# Patient Record
Sex: FEMALE | Race: WHITE | NOT HISPANIC OR LATINO | Employment: FULL TIME | URBAN - METROPOLITAN AREA
[De-identification: names, ages, dates, MRNs, and addresses within clinical notes are randomized per-mention and may not be internally consistent; named-entity substitution may affect disease eponyms.]

---

## 2019-10-14 ENCOUNTER — TRANSCRIBE ORDERS (OUTPATIENT)
Dept: URGENT CARE | Facility: CLINIC | Age: 24
End: 2019-10-14

## 2019-10-14 ENCOUNTER — OFFICE VISIT (OUTPATIENT)
Dept: URGENT CARE | Facility: CLINIC | Age: 24
End: 2019-10-14

## 2019-10-14 VITALS
HEIGHT: 68 IN | WEIGHT: 189.2 LBS | SYSTOLIC BLOOD PRESSURE: 132 MMHG | DIASTOLIC BLOOD PRESSURE: 84 MMHG | BODY MASS INDEX: 28.67 KG/M2

## 2019-10-14 DIAGNOSIS — Z13.6 SCREENING FOR CARDIOVASCULAR CONDITION: Primary | ICD-10-CM

## 2019-10-14 DIAGNOSIS — Z23 NEEDS FLU SHOT: ICD-10-CM

## 2019-10-14 DIAGNOSIS — Z00.8 ENCOUNTER FOR BIOMETRIC SCREENING: Primary | ICD-10-CM

## 2019-10-14 DIAGNOSIS — Z00.8 ENCOUNTER FOR BIOMETRIC SCREENING: ICD-10-CM

## 2019-10-14 DIAGNOSIS — Z23 NEED FOR INFLUENZA VACCINATION: ICD-10-CM

## 2019-10-14 DIAGNOSIS — Z01.89 ENCOUNTER FOR TOBACCO USE SCREENING: Primary | ICD-10-CM

## 2019-10-14 PROCEDURE — 80323 ALKALOIDS NOS: CPT | Performed by: NURSE PRACTITIONER

## 2019-10-14 NOTE — PROGRESS NOTES
Biometric screening only       /84   Ht 5' 8" (1 727 m)   Wt 85 8 kg (189 lb 3 2 oz)   BMI 28 77 kg/m²   Waist: 33 25in

## 2019-10-17 LAB
COTININE SERPL-MCNC: NORMAL NG/ML
NICOTINE SERPL-MCNC: NORMAL NG/ML

## 2022-09-06 DIAGNOSIS — E03.9 HYPOTHYROIDISM, UNSPECIFIED TYPE: Primary | ICD-10-CM

## 2022-09-06 RX ORDER — LEVOTHYROXINE SODIUM 125 MCG
TABLET ORAL
Qty: 90 TABLET | Refills: 0 | Status: SHIPPED | OUTPATIENT
Start: 2022-09-06 | End: 2022-09-21 | Stop reason: SDUPTHER

## 2022-09-16 DIAGNOSIS — E03.9 HYPOTHYROIDISM, UNSPECIFIED TYPE: ICD-10-CM

## 2022-09-16 RX ORDER — LEVOTHYROXINE SODIUM 125 MCG
125 TABLET ORAL EVERY MORNING
Qty: 90 TABLET | Refills: 0 | Status: CANCELLED | OUTPATIENT
Start: 2022-09-16 | End: 2022-12-15

## 2022-09-21 DIAGNOSIS — E03.9 HYPOTHYROIDISM, UNSPECIFIED TYPE: ICD-10-CM

## 2022-09-21 RX ORDER — LEVOTHYROXINE SODIUM 125 MCG
125 TABLET ORAL EVERY MORNING
Qty: 90 TABLET | Refills: 0 | Status: SHIPPED | OUTPATIENT
Start: 2022-09-21 | End: 2022-09-22 | Stop reason: DRUGHIGH

## 2022-09-23 DIAGNOSIS — E03.9 HYPOTHYROIDISM, UNSPECIFIED TYPE: ICD-10-CM

## 2022-09-23 RX ORDER — LEVOTHYROXINE SODIUM 125 MCG
TABLET ORAL
Qty: 90 TABLET | Refills: 0 | Status: CANCELLED | OUTPATIENT
Start: 2022-09-23 | End: 2022-12-22

## 2022-09-23 RX ORDER — LEVOTHYROXINE SODIUM 125 MCG
125 TABLET ORAL EVERY MORNING
Qty: 90 TABLET | Refills: 0 | Status: CANCELLED | OUTPATIENT
Start: 2022-09-23 | End: 2022-12-22

## 2022-09-23 RX ORDER — LEVOTHYROXINE SODIUM 150 MCG
150 TABLET ORAL
Qty: 90 TABLET | Refills: 0 | Status: SHIPPED | OUTPATIENT
Start: 2022-09-23 | End: 2022-11-18 | Stop reason: SDUPTHER

## 2022-10-03 ENCOUNTER — APPOINTMENT (OUTPATIENT)
Dept: URGENT CARE | Facility: CLINIC | Age: 27
End: 2022-10-03

## 2022-10-03 DIAGNOSIS — Z23 NEEDS FLU SHOT: Primary | ICD-10-CM

## 2022-10-31 ENCOUNTER — TELEPHONE (OUTPATIENT)
Dept: FAMILY MEDICINE CLINIC | Facility: CLINIC | Age: 27
End: 2022-10-31

## 2022-11-02 ENCOUNTER — TELEPHONE (OUTPATIENT)
Dept: FAMILY MEDICINE CLINIC | Facility: CLINIC | Age: 27
End: 2022-11-02

## 2022-11-02 DIAGNOSIS — E03.9 HYPOTHYROIDISM, UNSPECIFIED TYPE: Primary | ICD-10-CM

## 2022-11-18 ENCOUNTER — OFFICE VISIT (OUTPATIENT)
Dept: FAMILY MEDICINE CLINIC | Facility: CLINIC | Age: 27
End: 2022-11-18

## 2022-11-18 VITALS
BODY MASS INDEX: 28.64 KG/M2 | WEIGHT: 189 LBS | SYSTOLIC BLOOD PRESSURE: 120 MMHG | HEART RATE: 92 BPM | DIASTOLIC BLOOD PRESSURE: 80 MMHG | HEIGHT: 68 IN | OXYGEN SATURATION: 98 %

## 2022-11-18 DIAGNOSIS — Z13.0 SCREENING FOR DEFICIENCY ANEMIA: ICD-10-CM

## 2022-11-18 DIAGNOSIS — E03.9 HYPOTHYROIDISM, UNSPECIFIED TYPE: ICD-10-CM

## 2022-11-18 DIAGNOSIS — R73.03 PRE-DIABETES: ICD-10-CM

## 2022-11-18 DIAGNOSIS — E03.9 ACQUIRED HYPOTHYROIDISM: Primary | ICD-10-CM

## 2022-11-18 DIAGNOSIS — E78.5 DYSLIPIDEMIA: ICD-10-CM

## 2022-11-18 DIAGNOSIS — F41.9 ANXIETY: ICD-10-CM

## 2022-11-18 RX ORDER — MELATONIN
1000 2 TIMES DAILY
COMMUNITY

## 2022-11-18 RX ORDER — LEVOTHYROXINE SODIUM 150 MCG
150 TABLET ORAL
Qty: 90 TABLET | Refills: 0 | Status: SHIPPED | OUTPATIENT
Start: 2022-11-18 | End: 2023-03-17 | Stop reason: SDUPTHER

## 2022-11-18 RX ORDER — OXCARBAZEPINE 150 MG/1
150 TABLET, FILM COATED ORAL DAILY
COMMUNITY

## 2022-11-18 RX ORDER — MIRTAZAPINE 30 MG/1
30 TABLET, FILM COATED ORAL 2 TIMES DAILY
COMMUNITY

## 2022-11-18 RX ORDER — ALPRAZOLAM 0.25 MG/1
0.25 TABLET ORAL 2 TIMES DAILY PRN
COMMUNITY

## 2022-11-18 NOTE — PROGRESS NOTES
Office Visit Note  22     Cassidy Red 32 y o  female MRN: [id number]  : 1995    Assessment:     1  Acquired hypothyroidism  Assessment & Plan:  Patient with hypothyroidism currently on 150 mcg of brand name Synthroid with a TSH level of 0 01  Since patient is feeling better at this dosage he will continue the same for now and repeat the labs in about 6-8 weeks time and then decide whether to reduce the dose or not  2  Anxiety  Assessment & Plan:  Patient is currently taking mirtazapine 30 mg twice a day Trileptal 150 mg daily and Xanax b i d  0 25 mg p r n  and is being followed by the psychiatrist will continue the same  Discussion Summary and Plan: Today's care plan and medications were reviewed with patient in detail and all their questions answered to their satisfaction  Chief Complaint   Patient presents with   • Follow-up      Subjective:  Patient has come in for a follow-up evaluation with regards to hypothyroidism  Currently she is taking 150 mcg of brand name Synthroid  However her TSH level came back less than 0 01  When she was taking 125 mcg TSH level was high at 7 91  Patient however is feeling better at this dosage of 150 mcg now  She also has a history of anxiety being followed by the psychiatrist   Medications reviewed  The following portions of the patient's history were reviewed and updated as appropriate: allergies, current medications, past family history, past medical history, past social history, past surgical history and problem list     Review of Systems   Constitutional: Negative for chills and fever  HENT: Negative for ear pain and sore throat  Eyes: Negative for pain and visual disturbance  Respiratory: Negative for cough and shortness of breath  Cardiovascular: Negative for chest pain and palpitations  Gastrointestinal: Negative for abdominal pain and vomiting  Genitourinary: Negative for dysuria and hematuria     Musculoskeletal: Negative for arthralgias and back pain  Skin: Negative for color change and rash  Neurological: Negative for seizures and syncope  All other systems reviewed and are negative  Historical Information   Patient Active Problem List   Diagnosis   • Acquired hypothyroidism   • Anxiety     History reviewed  No pertinent past medical history  History reviewed  No pertinent surgical history  Social History     Substance and Sexual Activity   Alcohol Use None     Social History     Substance and Sexual Activity   Drug Use Not on file     Social History     Tobacco Use   Smoking Status Not on file   Smokeless Tobacco Not on file     History reviewed  No pertinent family history  Health Maintenance Due   Topic   • Hepatitis C Screening    • Depression Screening    • HIV Screening    • BMI: Followup Plan    • BMI: Adult    • Annual Physical    • Cervical Cancer Screening       Meds/Allergies       Current Outpatient Medications:   •  ALPRAZolam (XANAX) 0 25 mg tablet, Take 0 25 mg by mouth 2 (two) times a day as needed for anxiety, Disp: , Rfl:   •  cholecalciferol (VITAMIN D3) 1,000 units tablet, Take 1,000 Units by mouth 2 (two) times a day, Disp: , Rfl:   •  mirtazapine (REMERON) 30 mg tablet, Take 30 mg by mouth 2 (two) times a day, Disp: , Rfl:   •  OXcarbazepine (Trileptal) 150 mg tablet, Take 150 mg by mouth daily, Disp: , Rfl:   •  Synthroid 150 MCG tablet, Take 1 tablet (150 mcg total) by mouth daily in the early morning, Disp: 90 tablet, Rfl: 0      Objective:    Vitals:   /80 (BP Location: Right arm, Patient Position: Sitting, Cuff Size: Standard)   Pulse 92   Ht 5' 8" (1 727 m)   Wt 85 7 kg (189 lb)   SpO2 98%   BMI 28 74 kg/m²   Body mass index is 28 74 kg/m²  Vitals:    11/18/22 1008   Weight: 85 7 kg (189 lb)       Physical Exam  Vitals and nursing note reviewed  Constitutional:       Appearance: Normal appearance     Cardiovascular:      Rate and Rhythm: Normal rate and regular rhythm  Heart sounds: Normal heart sounds  Pulmonary:      Effort: Pulmonary effort is normal       Breath sounds: Normal breath sounds  Musculoskeletal:      Cervical back: Normal range of motion and neck supple  Right lower leg: No edema  Left lower leg: No edema  Neurological:      Mental Status: She is alert and oriented to person, place, and time  Lab Review   No visits with results within 2 Month(s) from this visit  Latest known visit with results is:   Orders Only on 10/14/2019   Component Date Value Ref Range Status   • Nicotine 10/14/2019 None Detected  ng/mL Final    This test was developed and its performance characteristics  determined by [facility]  It has not been cleared or approved  by the Food and Drug Administration  Nicotine levels greater than 2 0 are consistent with the use of  tobacco or tobacco cessation products  • Cotinine 10/14/2019 None Detected  ng/mL Final    This test was developed and its performance characteristics  determined by [facility]  It has not been cleared or approved  by the Food and Drug Administration  Cotinine levels greater than 20 0 are consistent with the use of  tobacco or tobacco cessation products  Yosvany Hummel MD        "This note has been constructed using a voice recognition system  Therefore there may be syntax, spelling, and/or grammatical errors   Please call if you have any questions  "

## 2023-03-17 ENCOUNTER — OFFICE VISIT (OUTPATIENT)
Dept: FAMILY MEDICINE CLINIC | Facility: CLINIC | Age: 28
End: 2023-03-17

## 2023-03-17 VITALS
SYSTOLIC BLOOD PRESSURE: 126 MMHG | BODY MASS INDEX: 28.74 KG/M2 | HEIGHT: 68 IN | DIASTOLIC BLOOD PRESSURE: 76 MMHG | HEART RATE: 66 BPM | OXYGEN SATURATION: 97 %

## 2023-03-17 DIAGNOSIS — E03.9 ACQUIRED HYPOTHYROIDISM: Primary | ICD-10-CM

## 2023-03-17 DIAGNOSIS — E03.9 HYPOTHYROIDISM, UNSPECIFIED TYPE: ICD-10-CM

## 2023-03-17 DIAGNOSIS — E04.1 THYROID NODULE: ICD-10-CM

## 2023-03-17 DIAGNOSIS — E78.00 HYPERCHOLESTEROLEMIA: ICD-10-CM

## 2023-03-17 DIAGNOSIS — F41.9 ANXIETY: ICD-10-CM

## 2023-03-17 RX ORDER — LEVOTHYROXINE SODIUM 150 MCG
150 TABLET ORAL
Qty: 90 TABLET | Refills: 1 | Status: SHIPPED | OUTPATIENT
Start: 2023-03-17

## 2023-03-17 NOTE — PROGRESS NOTES
Office Visit Note  23     Di Red 25 y o  female MRN: [id number]  : 1995    Assessment:     1  Acquired hypothyroidism  Assessment & Plan: We will continue the current dosage of brand-name Synthroid follow-up with repeat lab at a later date  We will also get ultrasound of the thyroid  Orders:  -     TSH, 3rd generation with Free T4 reflex; Future; Expected date: 2023    2  Anxiety  Assessment & Plan:  Patient currently on mirtazapine 30 mg twice a day Trileptal 150 mg daily and Xanax 0 25 mg twice daily as needed  She sees a psychiatrist every 3 months  3  Thyroid nodule  -     US thyroid; Future; Expected date: 2023    4  Hypercholesterolemia  Assessment & Plan:  Since patient's cholesterol coming up slightly high we will repeat the labs in 4 months from now make sure it is not going up any higher and then decide    Orders:  -     Lipid panel; Future      BMI Counseling: Body mass index is 28 74 kg/m²  The BMI is above normal  Nutrition recommendations include decreasing portion sizes, decreasing fast food intake, consuming healthier snacks, moderation in carbohydrate intake and reducing intake of cholesterol  Exercise recommendations include moderate physical activity 150 minutes/week  No pharmacotherapy was ordered  Rationale for BMI follow-up plan is due to patient being overweight or obese  Depression Screening and Follow-up Plan: Patient was screened for depression during today's encounter  They screened negative with a PHQ-2 score of 0  Discussion Summary and Plan: Today's care plan and medications were reviewed with patient in detail and all their questions answered to their satisfaction  Chief Complaint   Patient presents with   • Follow-up      Subjective:  Patient has coming here for a follow-up evaluation regarding hypothyroidism currently taking 150 mcg of brand-name Synthroid daily last TSH level was 0 05    Patient feels better at this dosing when we try to lower it she was not feeling right  Lipid profile also showed slight elevation in the cholesterol even though she feels she is under very good strict diet  Her HDL is at 49  Family history of hypercholesterolemia  She is also being followed by the psychiatrist with regards to her anxiety currently taking Remeron and Trileptal      The following portions of the patient's history were reviewed and updated as appropriate: allergies, current medications, past family history, past medical history, past social history, past surgical history and problem list     Review of Systems   Constitutional: Negative for chills and fever  HENT: Negative for ear pain and sore throat  Eyes: Negative for pain and visual disturbance  Respiratory: Negative for cough and shortness of breath  Cardiovascular: Negative for chest pain and palpitations  Gastrointestinal: Negative for abdominal pain and vomiting  Genitourinary: Negative for dysuria and hematuria  Musculoskeletal: Negative for arthralgias and back pain  Skin: Negative for color change and rash  Neurological: Negative for seizures and syncope  All other systems reviewed and are negative  Historical Information   Patient Active Problem List   Diagnosis   • Acquired hypothyroidism   • Anxiety   • Hypercholesterolemia     History reviewed  No pertinent past medical history  History reviewed  No pertinent surgical history  Social History     Substance and Sexual Activity   Alcohol Use None    Comment: Occasional     Social History     Substance and Sexual Activity   Drug Use Never     Social History     Tobacco Use   Smoking Status Never   Smokeless Tobacco Never     History reviewed  No pertinent family history    Health Maintenance Due   Topic   • Hepatitis C Screening    • HIV Screening    • BMI: Followup Plan    • Annual Physical    • Cervical Cancer Screening       Meds/Allergies       Current Outpatient Medications:   • ALPRAZolam (XANAX) 0 25 mg tablet, Take 0 25 mg by mouth 2 (two) times a day as needed for anxiety, Disp: , Rfl:   •  cholecalciferol (VITAMIN D3) 1,000 units tablet, Take 1,000 Units by mouth 2 (two) times a day, Disp: , Rfl:   •  mirtazapine (REMERON) 30 mg tablet, Take 30 mg by mouth 2 (two) times a day, Disp: , Rfl:   •  OXcarbazepine (TRILEPTAL) 150 mg tablet, Take 150 mg by mouth daily, Disp: , Rfl:   •  Synthroid 150 MCG tablet, Take 1 tablet (150 mcg total) by mouth daily in the early morning, Disp: 90 tablet, Rfl: 0      Objective:    Vitals:   /76 (BP Location: Right arm, Patient Position: Sitting, Cuff Size: Standard)   Pulse 66   Ht 5' 8" (1 727 m)   SpO2 97%   BMI 28 74 kg/m²   Body mass index is 28 74 kg/m²  Vitals:       Physical Exam  Vitals and nursing note reviewed  Constitutional:       Appearance: Normal appearance  Neck:      Comments: Question thyroid nodule  Cardiovascular:      Rate and Rhythm: Normal rate and regular rhythm  Heart sounds: Normal heart sounds  Pulmonary:      Effort: Pulmonary effort is normal       Breath sounds: Normal breath sounds  Musculoskeletal:      Cervical back: Normal range of motion and neck supple  Right lower leg: No edema  Left lower leg: No edema  Neurological:      Mental Status: She is alert  Lab Review   No visits with results within 2 Month(s) from this visit  Latest known visit with results is:   Orders Only on 10/14/2019   Component Date Value Ref Range Status   • Nicotine 10/14/2019 None Detected  ng/mL Final    This test was developed and its performance characteristics  determined by [facility]  It has not been cleared or approved  by the Food and Drug Administration  Nicotine levels greater than 2 0 are consistent with the use of  tobacco or tobacco cessation products     • Cotinine 10/14/2019 None Detected  ng/mL Final    This test was developed and its performance characteristics  determined by [facility]  It has not been cleared or approved  by the Food and Drug Administration  Cotinine levels greater than 20 0 are consistent with the use of  tobacco or tobacco cessation products  Elba Erwin MD        "This note has been constructed using a voice recognition system  Therefore there may be syntax, spelling, and/or grammatical errors   Please call if you have any questions  "

## 2023-03-17 NOTE — ASSESSMENT & PLAN NOTE
Patient currently on mirtazapine 30 mg twice a day Trileptal 150 mg daily and Xanax 0 25 mg twice daily as needed  She sees a psychiatrist every 3 months

## 2023-03-17 NOTE — ASSESSMENT & PLAN NOTE
Since patient's cholesterol coming up slightly high we will repeat the labs in 4 months from now make sure it is not going up any higher and then decide

## 2023-03-17 NOTE — ASSESSMENT & PLAN NOTE
We will continue the current dosage of brand-name Synthroid follow-up with repeat lab at a later date  We will also get ultrasound of the thyroid

## 2023-06-13 ENCOUNTER — HOSPITAL ENCOUNTER (OUTPATIENT)
Dept: ULTRASOUND IMAGING | Facility: HOSPITAL | Age: 28
Discharge: HOME | End: 2023-06-13
Payer: COMMERCIAL

## 2023-06-13 DIAGNOSIS — E04.1 THYROID NODULE: ICD-10-CM

## 2023-06-13 PROCEDURE — 76536 US EXAM OF HEAD AND NECK: CPT

## 2023-06-20 ENCOUNTER — APPOINTMENT (OUTPATIENT)
Dept: URGENT CARE | Facility: CLINIC | Age: 28
End: 2023-06-20

## 2023-06-20 DIAGNOSIS — E03.9 ACQUIRED HYPOTHYROIDISM: Primary | ICD-10-CM

## 2023-06-20 DIAGNOSIS — E78.00 HYPERCHOLESTEROLEMIA: ICD-10-CM

## 2023-06-20 LAB
CHOLEST SERPL-MCNC: 203 MG/DL
HDLC SERPL-MCNC: 57 MG/DL
LDLC SERPL CALC-MCNC: 130 MG/DL (ref 0–100)
NONHDLC SERPL-MCNC: 146 MG/DL
T4 FREE SERPL-MCNC: 1.15 NG/DL (ref 0.61–1.12)
TRIGL SERPL-MCNC: 78 MG/DL
TSH SERPL DL<=0.05 MIU/L-ACNC: 0.45 UIU/ML (ref 0.45–4.5)

## 2023-06-20 PROCEDURE — 84443 ASSAY THYROID STIM HORMONE: CPT | Performed by: INTERNAL MEDICINE

## 2023-06-20 PROCEDURE — 84439 ASSAY OF FREE THYROXINE: CPT | Performed by: NURSE PRACTITIONER

## 2023-06-20 PROCEDURE — 80061 LIPID PANEL: CPT | Performed by: INTERNAL MEDICINE

## 2023-07-20 ENCOUNTER — OFFICE VISIT (OUTPATIENT)
Dept: FAMILY MEDICINE CLINIC | Facility: CLINIC | Age: 28
End: 2023-07-20
Payer: COMMERCIAL

## 2023-07-20 VITALS
HEIGHT: 68 IN | OXYGEN SATURATION: 98 % | HEART RATE: 74 BPM | DIASTOLIC BLOOD PRESSURE: 74 MMHG | SYSTOLIC BLOOD PRESSURE: 112 MMHG | BODY MASS INDEX: 28.74 KG/M2

## 2023-07-20 DIAGNOSIS — F41.9 ANXIETY: ICD-10-CM

## 2023-07-20 DIAGNOSIS — E03.9 HYPOTHYROIDISM, UNSPECIFIED TYPE: ICD-10-CM

## 2023-07-20 DIAGNOSIS — E78.00 HYPERCHOLESTEROLEMIA: Primary | ICD-10-CM

## 2023-07-20 DIAGNOSIS — E03.9 ACQUIRED HYPOTHYROIDISM: ICD-10-CM

## 2023-07-20 DIAGNOSIS — R21 RASH: ICD-10-CM

## 2023-07-20 PROCEDURE — 99214 OFFICE O/P EST MOD 30 MIN: CPT | Performed by: INTERNAL MEDICINE

## 2023-07-20 RX ORDER — OXCARBAZEPINE 300 MG/1
300 TABLET, FILM COATED ORAL 2 TIMES DAILY
COMMUNITY
Start: 2023-05-22

## 2023-07-20 RX ORDER — TRIAMCINOLONE ACETONIDE 5 MG/G
CREAM TOPICAL 3 TIMES DAILY
Qty: 30 G | Refills: 0 | Status: SHIPPED | OUTPATIENT
Start: 2023-07-20

## 2023-07-20 RX ORDER — LEVOTHYROXINE SODIUM 150 MCG
150 TABLET ORAL
Qty: 90 TABLET | Refills: 1 | Status: SHIPPED | OUTPATIENT
Start: 2023-07-20

## 2023-07-20 RX ORDER — MIRTAZAPINE 30 MG/1
30 TABLET, FILM COATED ORAL
Status: SHIPPED
Start: 2023-07-20

## 2023-07-20 NOTE — ASSESSMENT & PLAN NOTE
Since the TSH level is almost in the normal range I will continue the same dose of the brand-name Synthroid at 150 mcg daily ultrasound of the thyroid showed small nodule on the right side does not meet the criteria for getting a biopsy we will continue to monitor.

## 2023-07-20 NOTE — PROGRESS NOTES
Name: Peter Squires      : 1995      MRN: [id number]  Encounter Provider: Lucero Arias MD  Encounter Date: 2023   Encounter department: [address] [place] [address]     1. Hypercholesterolemia  Assessment & Plan:  Patient cholesterol did come down we will try to monitor closely however her LDL is still high at 138 again recommend to be on strict diet exercise lifestyle modification      2. Anxiety  Assessment & Plan:  Patient is being followed by the psychiatrist currently on mirtazapine 30 mg at bedtime Trileptal 150 mg twice a day and she is also on Xanax 0.25 mg twice a day as needed. 3. Acquired hypothyroidism  Assessment & Plan:  Since the TSH level is almost in the normal range I will continue the same dose of the brand-name Synthroid at 150 mcg daily ultrasound of the thyroid showed small nodule on the right side does not meet the criteria for getting a biopsy we will continue to monitor. 4. Rash  Assessment & Plan:  Small area of rash noted on the left calf area and also right shoulder area appears like dermatitis we will prescribe triamcinolone cream for local application. BMI Counseling: Body mass index is 28.74 kg/m². The BMI is above normal. Nutrition recommendations include decreasing portion sizes, encouraging healthy choices of fruits and vegetables, consuming healthier snacks, moderation in carbohydrate intake and reducing intake of cholesterol. Exercise recommendations include strength training exercises. No pharmacotherapy was ordered. Rationale for BMI follow-up plan is due to patient being overweight or obese. Subjective     Patient is coming here for evaluation regarding her hypothyroidism, hypercholesterolemia. Patient has noticed rash on the left leg in the calf area for the past couple of months no itching about the size of 1-1/2 cm in diameter. She also noticed  near the anterior aspect of the right shoulder really.   Patient had lab work done which has shown a TSH level of 0.448 and the T4 level was 1.15 currently patient is taking 150 mcg of brand-name Synthroid daily. Her cholesterol level has come down compared to before it was 215 now it is 203. Review of Systems   Constitutional: Negative for appetite change, chills, fatigue and fever. HENT: Negative for ear pain, sore throat and trouble swallowing. Eyes: Negative for pain and visual disturbance. Respiratory: Negative for cough and shortness of breath. Cardiovascular: Negative for chest pain and palpitations. Gastrointestinal: Negative for abdominal pain, constipation, diarrhea and vomiting. Endocrine: Negative for polydipsia. Genitourinary: Negative for difficulty urinating, dysuria, flank pain and hematuria. Musculoskeletal: Negative for arthralgias, back pain and myalgias. Skin: Negative for rash and wound. Neurological: Negative for dizziness, tremors, seizures, syncope and headaches. Psychiatric/Behavioral: Negative for confusion and sleep disturbance. The patient is not nervous/anxious. All other systems reviewed and are negative. No past medical history on file. No past surgical history on file. No family history on file.   Social History     Socioeconomic History   • Marital status: Single     Spouse name: Not on file   • Number of children: Not on file   • Years of education: Not on file   • Highest education level: Not on file   Occupational History   • Not on file   Tobacco Use   • Smoking status: Never   • Smokeless tobacco: Never   Substance and Sexual Activity   • Alcohol use: Not on file     Comment: Occasional   • Drug use: Never   • Sexual activity: Not on file   Other Topics Concern   • Not on file   Social History Narrative   • Not on file     Social Determinants of Health     Financial Resource Strain: Not on file   Food Insecurity: Not on file   Transportation Needs: Not on file   Physical Activity: Not on file   Stress: Not on file   Social Connections: Not on file   Intimate Partner Violence: Not on file   Housing Stability: Not on file     Current Outpatient Medications on File Prior to Visit   Medication Sig   • ALPRAZolam (XANAX) 0.25 mg tablet Take 0.25 mg by mouth 2 (two) times a day as needed for anxiety   • cholecalciferol (VITAMIN D3) 1,000 units tablet Take 1,000 Units by mouth 2 (two) times a day   • OXcarbazepine (TRILEPTAL) 300 mg tablet Take 300 mg by mouth 2 (two) times a day   • Synthroid 150 MCG tablet Take 1 tablet (150 mcg total) by mouth daily in the early morning   • [DISCONTINUED] mirtazapine (REMERON) 30 mg tablet Take 30 mg by mouth 2 (two) times a day   • [DISCONTINUED] OXcarbazepine (TRILEPTAL) 150 mg tablet Take 150 mg by mouth daily     No Known Allergies  Immunization History   Administered Date(s) Administered   • COVID-19 MODERNA VACC 0.5 ML IM 01/20/2021, 02/17/2021, 11/29/2021   • COVID-19 Moderna Vac BIVALENT 12 Yr+ IM (BOOSTER ONLY) 0.5 ML 10/17/2022   • DTP 1995, 1995, 01/11/1994   • DTaP 5 10/24/1994, 09/10/1998   • HPV Quadrivalent 08/10/2007, 10/09/2007, 03/03/2008   • Hep B, adult 1995, 1995, 01/11/1994   • Hib (HbOC) 1995, 1995, 01/11/1994, 07/05/1994   • INFLUENZA 11/05/2014, 11/19/2018, 10/14/2019, 11/01/2021, 10/03/2022   • IPV 1995, 1995, 10/24/1994   • Influenza, injectable, quadrivalent, preservative free 0.5 mL 10/14/2019, 10/03/2022   • Influenza, seasonal, injectable 11/17/2008   • MMR 07/05/1994, 09/10/1998   • Meningococcal, Unknown Serogroups 08/10/2007   • OPV 09/10/1996   • Td (adult), adsorbed 02/09/2005   • Tdap 09/07/2013, 11/19/2018   • Varicella 07/12/1995       Objective     /74   Pulse 74   Ht 5' 8" (1.727 m)   SpO2 98%   BMI 28.74 kg/m²     Physical Exam  Vitals and nursing note reviewed. Constitutional:       Appearance: Normal appearance. She is not ill-appearing. HENT:      Head: Normocephalic.       Right Ear: External ear normal. There is no impacted cerumen. Left Ear: External ear normal. There is no impacted cerumen. Nose: Nose normal. No congestion or rhinorrhea. Mouth/Throat:      Pharynx: Oropharynx is clear. No posterior oropharyngeal erythema. Eyes:      General:         Right eye: No discharge. Left eye: No discharge. Extraocular Movements: Extraocular movements intact. Conjunctiva/sclera: Conjunctivae normal.   Neck:      Comments: Question thyroid nodule  Cardiovascular:      Rate and Rhythm: Normal rate and regular rhythm. Pulses: Normal pulses. Heart sounds: Normal heart sounds. No murmur heard. Pulmonary:      Effort: Pulmonary effort is normal. No respiratory distress. Breath sounds: Normal breath sounds. Abdominal:      General: Bowel sounds are normal. There is no distension. Tenderness: There is no abdominal tenderness. Musculoskeletal:      Cervical back: Normal range of motion and neck supple. Right lower leg: No edema. Left lower leg: No edema. Skin:     Coloration: Skin is not jaundiced or pale. Findings: Rash (for 3 months on back of left calf and right arm pit) present. Neurological:      General: No focal deficit present. Mental Status: She is alert and oriented to person, place, and time. Sensory: No sensory deficit. Motor: No weakness. Coordination: Coordination normal.      Gait: Gait normal.   Psychiatric:         Mood and Affect: Mood normal.         Behavior: Behavior normal.         Thought Content:  Thought content normal.         Judgment: Judgment normal.       Elizabeth Story MD

## 2023-07-20 NOTE — ASSESSMENT & PLAN NOTE
Patient is being followed by the psychiatrist currently on mirtazapine 30 mg at bedtime Trileptal 150 mg twice a day and she is also on Xanax 0.25 mg twice a day as needed.

## 2023-07-20 NOTE — ASSESSMENT & PLAN NOTE
Patient cholesterol did come down we will try to monitor closely however her LDL is still high at 138 again recommend to be on strict diet exercise lifestyle modification

## 2023-07-20 NOTE — ASSESSMENT & PLAN NOTE
Small area of rash noted on the left calf area and also right shoulder area appears like dermatitis we will prescribe triamcinolone cream for local application.

## 2023-12-23 ENCOUNTER — OFFICE VISIT (OUTPATIENT)
Dept: URGENT CARE | Age: 28
End: 2023-12-23
Payer: COMMERCIAL

## 2023-12-23 VITALS
SYSTOLIC BLOOD PRESSURE: 122 MMHG | RESPIRATION RATE: 18 BRPM | HEART RATE: 89 BPM | OXYGEN SATURATION: 99 % | TEMPERATURE: 97.1 F | DIASTOLIC BLOOD PRESSURE: 80 MMHG

## 2023-12-23 DIAGNOSIS — J02.9 SORE THROAT: Primary | ICD-10-CM

## 2023-12-23 LAB — S PYO AG THROAT QL: NEGATIVE

## 2023-12-23 PROCEDURE — 87147 CULTURE TYPE IMMUNOLOGIC: CPT

## 2023-12-23 PROCEDURE — 87070 CULTURE OTHR SPECIMN AEROBIC: CPT

## 2023-12-23 PROCEDURE — 87880 STREP A ASSAY W/OPTIC: CPT

## 2023-12-23 PROCEDURE — 87636 SARSCOV2 & INF A&B AMP PRB: CPT

## 2023-12-23 NOTE — PATIENT INSTRUCTIONS
Hydration and rest.  Acetaminophen and ibuprofen for pain relief and fever reduction.   Recommend throat lozenges, anesthetic spray such as chloraseptic, and gargling warm salt water for throat irritation.   COVID/influenza testing.  Throat culture sent.   Use the [facility] MyChart to obtain lab results.  PCP follow up in 3-5 days.   Go to an emergency department if difficulty breathing occurs or if symptoms worsen.    Recommended supplements for potential COVID-19 is the following: Vitamin D3 2000 IU  daily ,  Vitamin C 1g  every 12 hours , Multivitamin Daily

## 2023-12-23 NOTE — PROGRESS NOTES
[facility] Now        NAME: Anali Red is a 28 y.o. female  : 1995    MRN: [id number]  DATE: 2023  TIME: 2:03 PM      Assessment and Plan     Sore throat [J02.9]  1. Sore throat  POCT rapid strepA    Throat culture    Covid/Flu- Office Collect Normal        Rapid strep negative. Throat culture sent. Will hold on antibiotics at this time, no exudate, no fever, exposure to covid     Patient Instructions     Hydration and rest.  Acetaminophen and ibuprofen for pain relief and fever reduction.   Recommend throat lozenges, anesthetic spray such as chloraseptic, and gargling warm salt water for throat irritation.   COVID/influenza testing.  Throat culture sent.   Use the [facility] MyChart to obtain lab results.  PCP follow up in 3-5 days.   Go to an emergency department if difficulty breathing occurs or if symptoms worsen.    Recommended supplements for potential COVID-19 is the following: Vitamin D3 2000 IU  daily ,  Vitamin C 1g  every 12 hours , Multivitamin Daily     Chief Complaint     Chief Complaint   Patient presents with    Sore Throat     Sore throat that has been worsening for past few days. Home covid test negative.          History of Present Illness     Patient is a 28-year-old female who presents with sore throat over the past few days.  States she had family members tested positive for COVID.  At home test was negative.  Denies cough.  Denies vomiting or diarrhea.  Denies fever.    Sore Throat   This is a new problem. The current episode started in the past 7 days. The problem has been gradually worsening. Neither side of throat is experiencing more pain than the other. There has been no fever. The pain is at a severity of 6/10. Associated symptoms include ear discharge, headaches and swollen glands. Pertinent negatives include no abdominal pain, congestion, coughing, diarrhea, drooling, ear pain, hoarse voice, plugged ear sensation, neck pain, shortness of breath, stridor,  trouble swallowing or vomiting. She has had no exposure to strep or mono.       Review of Systems     Review of Systems   HENT:  Positive for ear discharge and sore throat. Negative for congestion, drooling, ear pain, hoarse voice and trouble swallowing.    Respiratory:  Negative for cough, shortness of breath and stridor.    Gastrointestinal:  Negative for abdominal pain, diarrhea and vomiting.   Musculoskeletal:  Negative for neck pain.   Neurological:  Positive for headaches.   All other systems reviewed and are negative.        Current Medications       Current Outpatient Medications:     ALPRAZolam (XANAX) 0.25 mg tablet, Take 0.25 mg by mouth 2 (two) times a day as needed for anxiety, Disp: , Rfl:     cholecalciferol (VITAMIN D3) 1,000 units tablet, Take 1,000 Units by mouth 2 (two) times a day, Disp: , Rfl:     mirtazapine (REMERON) 30 mg tablet, Take 1 tablet (30 mg total) by mouth daily at bedtime, Disp: , Rfl:     OXcarbazepine (TRILEPTAL) 300 mg tablet, Take 300 mg by mouth 2 (two) times a day, Disp: , Rfl:     Synthroid 150 MCG tablet, Take 1 tablet (150 mcg total) by mouth daily in the early morning, Disp: 90 tablet, Rfl: 1    triamcinolone (KENALOG) 0.5 % cream, Apply topically 3 (three) times a day, Disp: 30 g, Rfl: 0    Current Allergies     Allergies as of 12/23/2023    (No Known Allergies)              The following portions of the patient's history were reviewed and updated as appropriate: allergies, current medications, past family history, past medical history, past social history, past surgical history and problem list.     History reviewed. No pertinent past medical history.    History reviewed. No pertinent surgical history.    History reviewed. No pertinent family history.      Medications have been verified.        Objective     /80   Pulse 89   Temp (!) 97.1 °F (36.2 °C)   Resp 18   SpO2 99%   No LMP recorded.         Physical Exam     Physical Exam  Vitals and nursing note  reviewed.   Constitutional:       General: She is awake. She is not in acute distress.     Appearance: Normal appearance. She is not ill-appearing, toxic-appearing or diaphoretic.   HENT:      Right Ear: Tympanic membrane, ear canal and external ear normal.      Left Ear: Tympanic membrane, ear canal and external ear normal.      Nose: Nose normal.      Mouth/Throat:      Lips: Pink.      Mouth: Mucous membranes are moist.      Pharynx: Oropharynx is clear. Uvula midline. Posterior oropharyngeal erythema (viral ulcers posterior pharynx) present. No pharyngeal swelling, oropharyngeal exudate or uvula swelling.      Tonsils: No tonsillar exudate or tonsillar abscesses. 1+ on the right. 2+ on the left.   Cardiovascular:      Rate and Rhythm: Normal rate.      Pulses: Normal pulses.      Heart sounds: Normal heart sounds, S1 normal and S2 normal.   Pulmonary:      Effort: Pulmonary effort is normal.      Breath sounds: Normal breath sounds and air entry.   Skin:     General: Skin is warm.      Capillary Refill: Capillary refill takes less than 2 seconds.   Neurological:      Mental Status: She is alert.   Psychiatric:         Mood and Affect: Mood normal.         Behavior: Behavior normal.         Thought Content: Thought content normal.         Judgment: Judgment normal.

## 2023-12-24 LAB
FLUAV RNA RESP QL NAA+PROBE: NEGATIVE
FLUBV RNA RESP QL NAA+PROBE: NEGATIVE
SARS-COV-2 RNA RESP QL NAA+PROBE: NEGATIVE

## 2023-12-26 LAB — BACTERIA THROAT CULT: ABNORMAL

## 2023-12-27 ENCOUNTER — TELEPHONE (OUTPATIENT)
Dept: URGENT CARE | Facility: MEDICAL CENTER | Age: 28
End: 2023-12-27

## 2023-12-27 DIAGNOSIS — J02.0 STREP THROAT: Primary | ICD-10-CM

## 2023-12-27 RX ORDER — PENICILLIN V POTASSIUM 500 MG/1
500 TABLET ORAL EVERY 12 HOURS
Qty: 20 TABLET | Refills: 0 | Status: SHIPPED | OUTPATIENT
Start: 2023-12-27 | End: 2024-01-06

## 2023-12-27 NOTE — TELEPHONE ENCOUNTER
Patient returned phone call. Aware of positive throat culture. Still having sore throat. Aware antibiotic will be sent to pharmacy on file.

## 2024-02-07 NOTE — PROGRESS NOTES
Office Visit Note  24     Anali Red 28 y.o. female MRN: [id number]  : 1995    Assessment:     1. Acquired hypothyroidism  Assessment & Plan:  Patient with hypothyroidism TSH level normal we will continue the same dose of Synthroid brand-name 150 mcg daily      2. Anxiety  Assessment & Plan:  Patient is being followed by the psychiatry is currently taking Trileptal 300 mg twice a day and mirtazapine 30 mg daily at bedtime we will continue follow-up with a psychiatrist.      3. Hypercholesterolemia  Assessment & Plan:  Mild elevation in the cholesterol was noted with elevated LDL we will get a repeat labs done    Orders:  -     Lipid panel; Future    4. Obesity (BMI 30.0-34.9)  Assessment & Plan:  Patient BMI is 32.08.  Patient has been trying to lose weight with good diet well-balanced diet going to the gym but she continues to have increased BMI's.  Discussed with patient regarding Wegovy once a week injection will order the same and see the response and follow-up.    Orders:  -     Semaglutide-Weight Management (WEGOVY) 0.25 MG/0.5ML; Inject 0.5 mL (0.25 mg total) under the skin once a week    5. Screening for deficiency anemia  -     CBC and differential; Future    6. Pre-diabetes  -     Comprehensive metabolic panel; Future  -     Hemoglobin A1C; Future; Expected date: 2024  -     UA w Reflex to Microscopic w Reflex to Culture; Future; Expected date: 2024               Discussion Summary and Plan:  Today's care plan and medications were reviewed with patient in detail and all their questions answered to their satisfaction.    Chief Complaint   Patient presents with    Follow-up      Subjective:  Patient is coming here for a follow-up evaluation with regards to symptoms of hypothyroidism and anxiety disorder mild elevation in cholesterol levels.  Recent TSH level came back normal currently she is taking Synthroid brand name 150 mcg daily.  Patient is also concerned about the weight her  BMI is 32.08 with a 211 pounds.  Medication reviewed labs reviewed.        The following portions of the patient's history were reviewed and updated as appropriate: allergies, current medications, past family history, past medical history, past social history, past surgical history and problem list.    Review of Systems   Constitutional:  Negative for chills and fever.   HENT:  Negative for ear pain and sore throat.    Eyes:  Negative for pain and visual disturbance.   Respiratory:  Negative for cough and shortness of breath.    Cardiovascular:  Negative for chest pain and palpitations.   Gastrointestinal:  Negative for abdominal pain and vomiting.   Genitourinary:  Negative for dysuria and hematuria.   Musculoskeletal:  Negative for arthralgias and back pain.   Skin:  Negative for color change and rash.   Neurological:  Negative for seizures and syncope.   All other systems reviewed and are negative.        Historical Information   Patient Active Problem List   Diagnosis    Acquired hypothyroidism    Anxiety    Hypercholesterolemia    Obesity (BMI 30.0-34.9)     History reviewed. No pertinent past medical history.  History reviewed. No pertinent surgical history.  Social History     Substance and Sexual Activity   Alcohol Use None    Comment: Occasional     Social History     Substance and Sexual Activity   Drug Use Never     Social History     Tobacco Use   Smoking Status Never   Smokeless Tobacco Never     History reviewed. No pertinent family history.  Health Maintenance Due   Topic    Hepatitis C Screening     HIV Screening     Annual Physical     Cervical Cancer Screening     COVID-19 Vaccine (5 - 2023-24 season)    Depression Screening       Meds/Allergies       Current Outpatient Medications:     ALPRAZolam (XANAX) 0.25 mg tablet, Take 0.25 mg by mouth 2 (two) times a day as needed for anxiety, Disp: , Rfl:     cholecalciferol (VITAMIN D3) 1,000 units tablet, Take 1,000 Units by mouth 2 (two) times a day,  "Disp: , Rfl:     mirtazapine (REMERON) 30 mg tablet, Take 1 tablet (30 mg total) by mouth daily at bedtime, Disp: , Rfl:     OXcarbazepine (TRILEPTAL) 300 mg tablet, Take 300 mg by mouth 2 (two) times a day, Disp: , Rfl:     Semaglutide-Weight Management (WEGOVY) 0.25 MG/0.5ML, Inject 0.5 mL (0.25 mg total) under the skin once a week, Disp: 2 mL, Rfl: 0    Synthroid 150 MCG tablet, Take 1 tablet (150 mcg total) by mouth daily in the early morning, Disp: 90 tablet, Rfl: 1      Objective:    Vitals:   /76 (BP Location: Right arm, Patient Position: Sitting, Cuff Size: Standard)   Pulse 87   Ht 5' 8\" (1.727 m)   Wt 95.7 kg (211 lb)   SpO2 98%   BMI 32.08 kg/m²   Body mass index is 32.08 kg/m².  Vitals:    02/08/24 1627   Weight: 95.7 kg (211 lb)       Physical Exam  Vitals and nursing note reviewed.   Constitutional:       Appearance: Normal appearance.   Cardiovascular:      Rate and Rhythm: Normal rate and regular rhythm.      Heart sounds: Normal heart sounds.   Pulmonary:      Effort: Pulmonary effort is normal.      Breath sounds: Normal breath sounds.   Abdominal:      Palpations: Abdomen is soft.   Musculoskeletal:         General: Normal range of motion.      Cervical back: Normal range of motion and neck supple.      Right lower leg: No edema.      Left lower leg: No edema.   Skin:     General: Skin is warm and dry.   Neurological:      General: No focal deficit present.      Mental Status: She is alert and oriented to person, place, and time.   Psychiatric:         Mood and Affect: Mood normal.         Behavior: Behavior normal.         Lab Review   Office Visit on 12/23/2023   Component Date Value Ref Range Status     RAPID STREP A 12/23/2023 Negative  Negative Final    Throat Culture 12/23/2023 2+ Growth of Beta Hemolytic Streptococcus NOT Group A (A)   Final    SARS-CoV-2 12/23/2023 Negative  Negative Final    INFLUENZA A PCR 12/23/2023 Negative  Negative Final    INFLUENZA B PCR 12/23/2023 " "Negative  Negative Final         Rasta Benoit MD        \"This note has been constructed using a voice recognition system.Therefore there may be syntax, spelling, and/or grammatical errors. Please call if you have any questions. \"  "

## 2024-02-08 ENCOUNTER — OFFICE VISIT (OUTPATIENT)
Dept: FAMILY MEDICINE CLINIC | Facility: CLINIC | Age: 29
End: 2024-02-08
Payer: COMMERCIAL

## 2024-02-08 VITALS
HEART RATE: 87 BPM | DIASTOLIC BLOOD PRESSURE: 76 MMHG | HEIGHT: 68 IN | OXYGEN SATURATION: 98 % | SYSTOLIC BLOOD PRESSURE: 120 MMHG | BODY MASS INDEX: 31.98 KG/M2 | WEIGHT: 211 LBS

## 2024-02-08 DIAGNOSIS — E03.9 ACQUIRED HYPOTHYROIDISM: Primary | ICD-10-CM

## 2024-02-08 DIAGNOSIS — Z13.0 SCREENING FOR DEFICIENCY ANEMIA: ICD-10-CM

## 2024-02-08 DIAGNOSIS — E78.00 HYPERCHOLESTEROLEMIA: ICD-10-CM

## 2024-02-08 DIAGNOSIS — R73.03 PRE-DIABETES: ICD-10-CM

## 2024-02-08 DIAGNOSIS — F41.9 ANXIETY: ICD-10-CM

## 2024-02-08 DIAGNOSIS — E66.9 OBESITY (BMI 30.0-34.9): ICD-10-CM

## 2024-02-08 PROBLEM — E66.811 OBESITY (BMI 30.0-34.9): Status: ACTIVE | Noted: 2024-02-08

## 2024-02-08 PROBLEM — R21 RASH: Status: RESOLVED | Noted: 2023-07-20 | Resolved: 2024-02-08

## 2024-02-08 PROCEDURE — 99214 OFFICE O/P EST MOD 30 MIN: CPT | Performed by: INTERNAL MEDICINE

## 2024-02-08 NOTE — ASSESSMENT & PLAN NOTE
Patient with hypothyroidism TSH level normal we will continue the same dose of Synthroid brand-name 150 mcg daily

## 2024-02-08 NOTE — ASSESSMENT & PLAN NOTE
Patient is being followed by the psychiatry is currently taking Trileptal 300 mg twice a day and mirtazapine 30 mg daily at bedtime we will continue follow-up with a psychiatrist.

## 2024-02-08 NOTE — ASSESSMENT & PLAN NOTE
Patient BMI is 32.08.  Patient has been trying to lose weight with good diet well-balanced diet going to the gym but she continues to have increased BMI's.  Discussed with patient regarding Wegovy once a week injection will order the same and see the response and follow-up.

## 2024-02-13 ENCOUNTER — TELEPHONE (OUTPATIENT)
Age: 29
End: 2024-02-13

## 2024-02-13 NOTE — TELEPHONE ENCOUNTER
Patient needs a prior auth for med Wegovy 0.25/0.5ml. It can be done via Cover My Meds electronically or by calling [employer]#[phone].           Please contact patient when this is done. Thank you for your help.

## 2024-02-14 ENCOUNTER — TELEPHONE (OUTPATIENT)
Age: 29
End: 2024-02-14

## 2024-02-14 NOTE — TELEPHONE ENCOUNTER
----- Message from Ramandeep Pratt sent at 2/14/2024  1:33 PM EST -----  Regarding: FW: Pre authorization needed wegovy  Contact: [phone]    ----- Message -----  From: Anali Red  Sent: 2/14/2024   1:17 PM EST  To: [facility]  Subject: Pre authorization needed wegovy                  Hello,    I need pre authorization for wegovy. I called to give the information. My prescription is through [employer]. You can submit prior auth electronically through Cover my meds.   2 other ways- can call it in to [phone]. Wegovy prior with [employer]   [email or web address]- prescribers forms and documents. And fax to [phone]

## 2024-02-15 NOTE — TELEPHONE ENCOUNTER
Spoke with pt and attempted to collect RX ID number. She states that she is currently driving, and she will send a message back after she has her card in hand.

## 2024-02-15 NOTE — TELEPHONE ENCOUNTER
PA for Wegovy 0.25 MG/0.5ML appealed via     []CMM  []SS  [x]Letter sent to insurance via fax [phone]  []Other site or means     All necessary records sent. Will await determination.

## 2024-02-15 NOTE — TELEPHONE ENCOUNTER
PA for Wegovy 0.25 MG/0.5ML Denied    Reason: Refer to Denial Letter in Media    Message sent to office clinical pool No Appeal Submitted do to inappropriate denial     Denial letter scanned into Media Yes    Appeal started Yes

## 2024-02-15 NOTE — TELEPHONE ENCOUNTER
PA for Wegovy 0.25 MG/0.5ML     Submitted via  []CMM-KEY   [][employer]-Case ID #   [x]Faxed to plan   []Other website   []Phone call Case ID #     Office notes sent, clinical questions answered. Awaiting determination       [facility] [phone]

## 2024-02-19 ENCOUNTER — APPOINTMENT (OUTPATIENT)
Dept: LAB | Facility: CLINIC | Age: 29
End: 2024-02-19

## 2024-02-19 ENCOUNTER — OCCMED (OUTPATIENT)
Dept: URGENT CARE | Facility: CLINIC | Age: 29
End: 2024-02-19

## 2024-02-19 DIAGNOSIS — Z02.1 PHYSICAL EXAM, PRE-EMPLOYMENT: ICD-10-CM

## 2024-02-19 DIAGNOSIS — Z02.1 PHYSICAL EXAM, PRE-EMPLOYMENT: Primary | ICD-10-CM

## 2024-02-19 LAB
MEV IGG SER QL IA: NORMAL
MUV IGG SER QL IA: NORMAL
RUBV IGG SERPL IA-ACNC: 75.2 IU/ML
VZV IGG SER QL IA: NORMAL

## 2024-02-19 PROCEDURE — 86787 VARICELLA-ZOSTER ANTIBODY: CPT

## 2024-02-19 PROCEDURE — 86762 RUBELLA ANTIBODY: CPT

## 2024-02-19 PROCEDURE — 36415 COLL VENOUS BLD VENIPUNCTURE: CPT

## 2024-02-19 PROCEDURE — 86765 RUBEOLA ANTIBODY: CPT

## 2024-02-19 PROCEDURE — 86480 TB TEST CELL IMMUN MEASURE: CPT

## 2024-02-19 PROCEDURE — 86735 MUMPS ANTIBODY: CPT

## 2024-02-20 LAB
GAMMA INTERFERON BACKGROUND BLD IA-ACNC: 0.13 IU/ML
M TB IFN-G BLD-IMP: NEGATIVE
M TB IFN-G CD4+ BCKGRND COR BLD-ACNC: 0 IU/ML
M TB IFN-G CD4+ BCKGRND COR BLD-ACNC: 0 IU/ML
MITOGEN IGNF BCKGRD COR BLD-ACNC: 9.87 IU/ML

## 2024-02-28 NOTE — PROGRESS NOTES
Office Visit Note  24     Anali Red 28 y.o. female MRN: [id number]  : 1995    Assessment:     1. Acute non-recurrent maxillary sinusitis  Assessment & Plan:  Patient with sinus symptoms sinusitis we will start her on Ceftin 250 twice a day and also Flonase nasal spray patient tested negative for COVID at home      2. Acquired hypothyroidism  Assessment & Plan:  Patient last TSH level is 2.28 currently she is taking 150 mcg of brand-name Synthroid we will continue with the same dose.      3. Anxiety  Assessment & Plan:  Patient is being followed with a psychiatrist currently taking Trileptal, mirtazapine continue the same      4. Hypercholesterolemia  Assessment & Plan:  Patient LDL was at 125 will monitor      5. Obesity (BMI 30.0-34.9)  Assessment & Plan:  BMI is 32.54 will try to her new insurance for the Wegovy injections to help with the weight reduction.                 Discussion Summary and Plan:  Today's care plan and medications were reviewed with patient in detail and all their questions answered to their satisfaction.    Chief Complaint   Patient presents with    Follow-up      Subjective:  Patient is coming in for a follow-up evaluation developed cold sinus symptoms postnasal dripping sore throat no chest congestion.  No fever no nausea vomiting had mild diarrhea.  Medications reviewed labs reviewed unremarkable findings.  Patient also was trying to lose weight we prescribed Wegovy.  She is changing her insurance will try again with the new insurance in couple of weeks.  Patient BMI is 32.54.        The following portions of the patient's history were reviewed and updated as appropriate: allergies, current medications, past family history, past medical history, past social history, past surgical history and problem list.    Review of Systems   Constitutional:  Negative for chills and fever.   HENT:  Negative for ear pain and sore throat.    Eyes:  Negative for pain and visual  disturbance.   Respiratory:  Negative for cough and shortness of breath.    Cardiovascular:  Negative for chest pain and palpitations.   Gastrointestinal:  Negative for abdominal pain and vomiting.   Genitourinary:  Negative for dysuria and hematuria.   Musculoskeletal:  Negative for arthralgias and back pain.   Skin:  Negative for color change and rash.   Neurological:  Negative for seizures and syncope.   All other systems reviewed and are negative.        Historical Information   Patient Active Problem List   Diagnosis    Acquired hypothyroidism    Anxiety    Hypercholesterolemia    Obesity (BMI 30.0-34.9)    Acute non-recurrent maxillary sinusitis     History reviewed. No pertinent past medical history.  History reviewed. No pertinent surgical history.  Social History     Substance and Sexual Activity   Alcohol Use None    Comment: Occasional     Social History     Substance and Sexual Activity   Drug Use Never     Social History     Tobacco Use   Smoking Status Never   Smokeless Tobacco Never     History reviewed. No pertinent family history.  Health Maintenance Due   Topic    Hepatitis C Screening     HIV Screening     Annual Physical     Cervical Cancer Screening     COVID-19 Vaccine (5 - 2023-24 season)    Depression Screening       Meds/Allergies       Current Outpatient Medications:     ALPRAZolam (XANAX) 0.25 mg tablet, Take 0.25 mg by mouth 2 (two) times a day as needed for anxiety, Disp: , Rfl:     cholecalciferol (VITAMIN D3) 1,000 units tablet, Take 1,000 Units by mouth 2 (two) times a day, Disp: , Rfl:     mirtazapine (REMERON) 30 mg tablet, Take 1 tablet (30 mg total) by mouth daily at bedtime, Disp: , Rfl:     OXcarbazepine (TRILEPTAL) 300 mg tablet, Take 300 mg by mouth 2 (two) times a day, Disp: , Rfl:     Semaglutide-Weight Management (WEGOVY) 0.25 MG/0.5ML, Inject 0.5 mL (0.25 mg total) under the skin once a week, Disp: 2 mL, Rfl: 0    Synthroid 150 MCG tablet, Take 1 tablet (150 mcg total) by  "mouth daily in the early morning, Disp: 90 tablet, Rfl: 1      Objective:    Vitals:   /74 (BP Location: Right arm, Patient Position: Sitting, Cuff Size: Standard)   Pulse 95   Ht 5' 8\" (1.727 m)   Wt 97.1 kg (214 lb)   SpO2 97%   BMI 32.54 kg/m²   Body mass index is 32.54 kg/m².  Vitals:    02/29/24 1603   Weight: 97.1 kg (214 lb)       Physical Exam  Vitals and nursing note reviewed.   Constitutional:       Appearance: Normal appearance.   HENT:      Nose: Congestion present.   Cardiovascular:      Rate and Rhythm: Normal rate and regular rhythm.      Heart sounds: Normal heart sounds.   Pulmonary:      Effort: Pulmonary effort is normal.      Breath sounds: Normal breath sounds.   Musculoskeletal:      Cervical back: Normal range of motion and neck supple.      Right lower leg: No edema.      Left lower leg: No edema.   Skin:     General: Skin is warm and dry.   Neurological:      Mental Status: She is alert and oriented to person, place, and time.   Psychiatric:         Mood and Affect: Mood normal.         Behavior: Behavior normal.       Recent Results (from the past 1344 hour(s))   TSH, 3RD GENERATION WITH FREE T4 REFLEX    Collection Time: 02/06/24 11:18 AM   Result Value Ref Range    TSH 2.28 0.45 - 5.33 uIU/mL   Varicella zoster antibody, IgG    Collection Time: 02/19/24 12:31 PM   Result Value Ref Range    Varicella IgG IMMUNE IMMUNE   Rubeola antibody IgG    Collection Time: 02/19/24 12:31 PM   Result Value Ref Range    Rubeola IgG IMMUNE IMMUNE   Mumps antibody, IgG    Collection Time: 02/19/24 12:31 PM   Result Value Ref Range    Mumps IgG IMMUNE IMMUNE   Rubella antibody, IgG    Collection Time: 02/19/24 12:31 PM   Result Value Ref Range    Rubella IgG Quant 75.2 >14.9 IU/mL   Quantiferon TB Gold Plus Gray    Collection Time: 02/19/24 12:31 PM   Result Value Ref Range    QFT Nil 0.13 0 - 8.0 IU/ml   Quantiferon TB Gold Plus Green    Collection Time: 02/19/24 12:31 PM   Result Value Ref " Range    QFT TB1-NIL 0.00 IU/ml   Quantiferon TB Gold Plus Yellow    Collection Time: 02/19/24 12:31 PM   Result Value Ref Range    QFT TB2-NIL 0.00 IU/ml   Quantiferon TB Gold Plus Purple    Collection Time: 02/19/24 12:31 PM   Result Value Ref Range    QFT Mitogen-NIL 9.87 IU/ml    QFT Final Interpretation Negative Negative   HEMOGLOBIN A1C    Collection Time: 02/23/24 12:00 PM   Result Value Ref Range    Hemoglobin A1C 4.9 <5.7 %    eAG, EST AVG Glucose 94 mg/dL   COMPREHENSIVE METABOLIC PANEL    Collection Time: 02/23/24 12:00 PM   Result Value Ref Range    Glucose 85 65 - 99 mg/dL    BUN 15 7 - 25 mg/dL    Creatinine 0.70 0.40 - 1.10 mg/dL    Sodium 136 135 - 145 mmol/L    Potassium 4.4 3.5 - 5.2 mmol/L    Chloride 102 100 - 109 mmol/L    Carbon Dioxide 26 21 - 31 mmol/L    Calcium 9.4 8.5 - 10.1 mg/dL    Alkaline Phosphatase 62 35 - 120 U/L    ALBUMIN 4.1 3.5 - 5.7 g/dL    Total Bilirubin 0.5 0.2 - 1.0 mg/dL    Protein, Total 7.0 6.3 - 8.3 g/dL    AST 25 <41 U/L    ALT 35 <56 U/L    ANION GAP 8 3 - 11    eGFRcr 119 >59    eGFR Comment Interpretive information: calculated GFR         Lab Review   Appointment on 02/19/2024   Component Date Value Ref Range Status    Varicella IgG 02/19/2024 IMMUNE  IMMUNE Final    Presumed immune to VZV IgG infection.    Rubeola IgG 02/19/2024 IMMUNE  IMMUNE Final    Presumed immune to Measles IgG infection    Mumps IgG 02/19/2024 IMMUNE  IMMUNE Final    Presumed immune to Mumps IgG infection.    Rubella IgG Quant 02/19/2024 75.2  >14.9 IU/mL Final    QFT Nil 02/19/2024 0.13  0 - 8.0 IU/ml Final    QFT TB1-NIL 02/19/2024 0.00  IU/ml Final    QFT TB2-NIL 02/19/2024 0.00  IU/ml Final    QFT Mitogen-NIL 02/19/2024 9.87  IU/ml Final    QFT Final Interpretation 02/19/2024 Negative  Negative Final    No Interferon-gamma response to M. tuberculosis antigens detected.  Infection with M. tuberculosis is unlikely.  A single negative result does not exclude infection with M. tuberculosis.  "In patients at high risk for M. tuberculosis infection, a second test should be considered in accordance with the 2017 ATS/IDSA/CDC Clinical Practice Guidelines for Diagnosis of Tuberculosis in Adults and Children. False negative results can be a result of incorrect blood sample collection or handling of the specimen affecting lymphocyte function.         Rasta Benoit MD        \"This note has been constructed using a voice recognition system.Therefore there may be syntax, spelling, and/or grammatical errors. Please call if you have any questions. \"  "

## 2024-02-29 ENCOUNTER — OFFICE VISIT (OUTPATIENT)
Dept: FAMILY MEDICINE CLINIC | Facility: CLINIC | Age: 29
End: 2024-02-29
Payer: COMMERCIAL

## 2024-02-29 VITALS
BODY MASS INDEX: 32.43 KG/M2 | WEIGHT: 214 LBS | HEIGHT: 68 IN | SYSTOLIC BLOOD PRESSURE: 120 MMHG | HEART RATE: 95 BPM | DIASTOLIC BLOOD PRESSURE: 74 MMHG | OXYGEN SATURATION: 97 %

## 2024-02-29 DIAGNOSIS — E78.00 HYPERCHOLESTEROLEMIA: ICD-10-CM

## 2024-02-29 DIAGNOSIS — F41.9 ANXIETY: ICD-10-CM

## 2024-02-29 DIAGNOSIS — E03.9 ACQUIRED HYPOTHYROIDISM: ICD-10-CM

## 2024-02-29 DIAGNOSIS — J01.00 ACUTE NON-RECURRENT MAXILLARY SINUSITIS: Primary | ICD-10-CM

## 2024-02-29 DIAGNOSIS — E66.9 OBESITY (BMI 30.0-34.9): ICD-10-CM

## 2024-02-29 PROCEDURE — 99213 OFFICE O/P EST LOW 20 MIN: CPT | Performed by: INTERNAL MEDICINE

## 2024-02-29 RX ORDER — FLUTICASONE PROPIONATE 50 MCG
2 SPRAY, SUSPENSION (ML) NASAL DAILY
Qty: 11.1 ML | Refills: 1 | Status: SHIPPED | OUTPATIENT
Start: 2024-02-29

## 2024-02-29 RX ORDER — CEFUROXIME AXETIL 250 MG/1
250 TABLET ORAL EVERY 12 HOURS SCHEDULED
Qty: 20 TABLET | Refills: 0 | Status: SHIPPED | OUTPATIENT
Start: 2024-02-29 | End: 2024-03-10

## 2024-02-29 NOTE — ASSESSMENT & PLAN NOTE
Patient last TSH level is 2.28 currently she is taking 150 mcg of brand-name Synthroid we will continue with the same dose.

## 2024-02-29 NOTE — ASSESSMENT & PLAN NOTE
BMI is 32.54 will try to her new insurance for the Wegovy injections to help with the weight reduction.

## 2024-02-29 NOTE — ASSESSMENT & PLAN NOTE
Patient with sinus symptoms sinusitis we will start her on Ceftin 250 twice a day and also Flonase nasal spray patient tested negative for COVID at home

## 2024-02-29 NOTE — ASSESSMENT & PLAN NOTE
Patient is being followed with a psychiatrist currently taking Trileptal, mirtazapine continue the same

## 2024-03-22 DIAGNOSIS — J01.00 ACUTE NON-RECURRENT MAXILLARY SINUSITIS: ICD-10-CM

## 2024-03-22 RX ORDER — FLUTICASONE PROPIONATE 50 MCG
SPRAY, SUSPENSION (ML) NASAL
Qty: 48 ML | Refills: 1 | Status: SHIPPED | OUTPATIENT
Start: 2024-03-22

## 2024-03-26 ENCOUNTER — TELEPHONE (OUTPATIENT)
Age: 29
End: 2024-03-26

## 2024-03-26 DIAGNOSIS — E66.9 OBESITY (BMI 30.0-34.9): ICD-10-CM

## 2024-03-26 NOTE — TELEPHONE ENCOUNTER
This is copy/paste from patient's mychart. Please advise:    Hello,   I spoke with dr marin as my insurance was changing. He said I should reach out with my new insurance and pharmacy so he can resubmit auth and send Wegovy medication to the new pharmacy. can you please request authorization again for Wegovy. My prescription insurance is also through [employer] with different ID numbers. Bin: [id number] RX PCN: [facility] Group: [id number] Issuer: [id number] Member ID: [id number]. I also need the medication sent to a new pharmacy. Pharmacy should be [facility]. [address] [address].   Thank you,  Anali

## 2024-03-29 NOTE — TELEPHONE ENCOUNTER
PA for Wegovy 0.25 MG/0.5ML    Submitted via    []CMM-KEY   [x][employer]-Case ID # [id number]  []Faxed to plan   []Other website   []Phone call Case ID #     Office notes sent, clinical questions answered. Awaiting determination    Turnaround time for your insurance to make a decision on your Prior Authorization can take 7-21 business days.

## 2024-04-03 NOTE — TELEPHONE ENCOUNTER
PA for Wegovy 0.25 MG/0.5ML Approved     Date(s) approved 3- - 3-        Patient advised by [x] [place] Message                      [] Phone call       Pharmacy advised by [x]Fax                                     []Phone call    Approval letter scanned into Media Yes

## 2024-04-11 DIAGNOSIS — E03.9 HYPOTHYROIDISM, UNSPECIFIED TYPE: ICD-10-CM

## 2024-04-11 RX ORDER — LEVOTHYROXINE SODIUM 150 MCG
150 TABLET ORAL
Qty: 90 TABLET | Refills: 1 | Status: SHIPPED | OUTPATIENT
Start: 2024-04-11

## 2024-04-22 DIAGNOSIS — E66.9 OBESITY (BMI 30.0-34.9): ICD-10-CM

## 2024-04-29 PROBLEM — J01.00 ACUTE NON-RECURRENT MAXILLARY SINUSITIS: Status: RESOLVED | Noted: 2024-02-29 | Resolved: 2024-04-29

## 2024-05-08 NOTE — PROGRESS NOTES
Office Visit Note  24     Anali Red 29 y.o. female MRN: [id number]  : 1995    Assessment:     1. Obesity (BMI 30.0-34.9)  Assessment & Plan:  As mentioned in HPI patient has been on Wegovy for past 4 weeks is 0.25 mg weekly and has lost 22 pounds so far will increase the dose to 0.5 and see the response and follow-up    Orders:  -     Semaglutide-Weight Management (WEGOVY) 0.5 MG/0.5ML; Inject 0.5 mL (0.5 mg total) under the skin once a week    2. Acquired hypothyroidism  Assessment & Plan:  Patient with hypothyroidism last TSH level is 2.28 continue with brand-name Synthroid 150 mcg daily.      3. Anxiety  Assessment & Plan:  Continue with Trileptal and mirtazapine follow-up with the psychiatrist patient also takes Xanax as needed      4. Hypercholesterolemia  Assessment & Plan:  Patient total cholesterol is at 198 LDL is at 125 will monitor            Depression Screening and Follow-up Plan: Patient was screened for depression during today's encounter. They screened negative with a PHQ-2 score of 0.          Discussion Summary and Plan:  Today's care plan and medications were reviewed with patient in detail and all their questions answered to their satisfaction.    Chief Complaint   Patient presents with   • Follow-up      Subjective:  Patient is coming here for a follow-up evaluation with regards to her obesity, hypothyroidism, elevation in the LDL cholesterol.  Patient has started Wegovy 0.25 mg weekly last couple of pounds in last 4 weeks time.  No side effects with the medication.  Patient also with hypothyroidism on brand-name Synthroid.  Medications reviewed labs reviewed        The following portions of the patient's history were reviewed and updated as appropriate: allergies, current medications, past family history, past medical history, past social history, past surgical history and problem list.    Review of Systems   Constitutional:  Negative for chills and fever.   HENT:  Negative for  ear pain and sore throat.    Eyes:  Negative for pain and visual disturbance.   Respiratory:  Negative for cough and shortness of breath.    Cardiovascular:  Negative for chest pain and palpitations.   Gastrointestinal:  Negative for abdominal pain and vomiting.   Genitourinary:  Negative for dysuria and hematuria.   Musculoskeletal:  Negative for arthralgias and back pain.   Skin:  Negative for color change and rash.   Neurological:  Negative for seizures and syncope.   All other systems reviewed and are negative.        Historical Information   Patient Active Problem List   Diagnosis   • Acquired hypothyroidism   • Anxiety   • Hypercholesterolemia   • Obesity (BMI 30.0-34.9)     History reviewed. No pertinent past medical history.  History reviewed. No pertinent surgical history.  Social History     Substance and Sexual Activity   Alcohol Use None    Comment: Occasional     Social History     Substance and Sexual Activity   Drug Use Never     Social History     Tobacco Use   Smoking Status Never   Smokeless Tobacco Never     History reviewed. No pertinent family history.  Health Maintenance Due   Topic   • Hepatitis C Screening    • HIV Screening    • Annual Physical    • Cervical Cancer Screening    • COVID-19 Vaccine (5 - 2023-24 season)      Meds/Allergies       Current Outpatient Medications:   •  ALPRAZolam (XANAX) 0.25 mg tablet, Take 0.25 mg by mouth 2 (two) times a day as needed for anxiety, Disp: , Rfl:   •  cholecalciferol (VITAMIN D3) 1,000 units tablet, Take 1,000 Units by mouth 2 (two) times a day, Disp: , Rfl:   •  fluticasone (FLONASE) 50 mcg/act nasal spray, SPRAY 2 SPRAYS INTO EACH NOSTRIL EVERY DAY, Disp: 48 mL, Rfl: 1  •  mirtazapine (REMERON) 30 mg tablet, Take 1 tablet (30 mg total) by mouth daily at bedtime, Disp: , Rfl:   •  OXcarbazepine (TRILEPTAL) 300 mg tablet, Take 300 mg by mouth 2 (two) times a day, Disp: , Rfl:   •  Semaglutide-Weight Management (WEGOVY) 0.5 MG/0.5ML, Inject 0.5 mL  "(0.5 mg total) under the skin once a week, Disp: 2 mL, Rfl: 0  •  Synthroid 150 MCG tablet, TAKE 1 TABLET (150 MCG TOTAL) BY MOUTH DAILY IN THE EARLY MORNING, Disp: 90 tablet, Rfl: 1      Objective:    Vitals:   /75 (BP Location: Right arm, Patient Position: Sitting, Cuff Size: Standard)   Pulse 93   Ht 5' 8\" (1.727 m)   Wt 96.2 kg (212 lb)   SpO2 97%   BMI 32.23 kg/m²   Body mass index is 32.23 kg/m².  Vitals:    05/09/24 1629   Weight: 96.2 kg (212 lb)       Physical Exam  Vitals and nursing note reviewed.   Constitutional:       Appearance: Normal appearance.   Cardiovascular:      Rate and Rhythm: Normal rate and regular rhythm.      Heart sounds: Normal heart sounds.   Pulmonary:      Effort: Pulmonary effort is normal.      Breath sounds: Normal breath sounds.   Musculoskeletal:      Cervical back: Normal range of motion and neck supple.      Right lower leg: No edema.      Left lower leg: No edema.   Skin:     General: Skin is warm and dry.   Neurological:      Mental Status: She is alert and oriented to person, place, and time.   Psychiatric:         Mood and Affect: Mood normal.         Behavior: Behavior normal.         Lab Review   No visits with results within 2 Month(s) from this visit.   Latest known visit with results is:   Appointment on 02/19/2024   Component Date Value Ref Range Status   • Varicella IgG 02/19/2024 IMMUNE  IMMUNE Final    Presumed immune to VZV IgG infection.   • Rubeola IgG 02/19/2024 IMMUNE  IMMUNE Final    Presumed immune to Measles IgG infection   • Mumps IgG 02/19/2024 IMMUNE  IMMUNE Final    Presumed immune to Mumps IgG infection.   • Rubella IgG Quant 02/19/2024 75.2  >14.9 IU/mL Final   • QFT Nil 02/19/2024 0.13  0 - 8.0 IU/ml Final   • QFT TB1-NIL 02/19/2024 0.00  IU/ml Final   • QFT TB2-NIL 02/19/2024 0.00  IU/ml Final   • QFT Mitogen-NIL 02/19/2024 9.87  IU/ml Final   • QFT Final Interpretation 02/19/2024 Negative  Negative Final    No Interferon-gamma response " "to M. tuberculosis antigens detected.  Infection with M. tuberculosis is unlikely.  A single negative result does not exclude infection with M. tuberculosis. In patients at high risk for M. tuberculosis infection, a second test should be considered in accordance with the 2017 ATS/IDSA/CDC Clinical Practice Guidelines for Diagnosis of Tuberculosis in Adults and Children. False negative results can be a result of incorrect blood sample collection or handling of the specimen affecting lymphocyte function.         Rasta Benoit MD        \"This note has been constructed using a voice recognition system.Therefore there may be syntax, spelling, and/or grammatical errors. Please call if you have any questions. \"  "

## 2024-05-09 ENCOUNTER — OFFICE VISIT (OUTPATIENT)
Dept: FAMILY MEDICINE CLINIC | Facility: CLINIC | Age: 29
End: 2024-05-09
Payer: COMMERCIAL

## 2024-05-09 VITALS
HEIGHT: 68 IN | WEIGHT: 212 LBS | HEART RATE: 93 BPM | SYSTOLIC BLOOD PRESSURE: 118 MMHG | BODY MASS INDEX: 32.13 KG/M2 | OXYGEN SATURATION: 97 % | DIASTOLIC BLOOD PRESSURE: 75 MMHG

## 2024-05-09 DIAGNOSIS — E03.9 ACQUIRED HYPOTHYROIDISM: ICD-10-CM

## 2024-05-09 DIAGNOSIS — F41.9 ANXIETY: ICD-10-CM

## 2024-05-09 DIAGNOSIS — E78.00 HYPERCHOLESTEROLEMIA: ICD-10-CM

## 2024-05-09 DIAGNOSIS — E66.9 OBESITY (BMI 30.0-34.9): Primary | ICD-10-CM

## 2024-05-09 PROCEDURE — 99213 OFFICE O/P EST LOW 20 MIN: CPT | Performed by: INTERNAL MEDICINE

## 2024-05-09 NOTE — ASSESSMENT & PLAN NOTE
Patient with hypothyroidism last TSH level is 2.28 continue with brand-name Synthroid 150 mcg daily.

## 2024-05-09 NOTE — ASSESSMENT & PLAN NOTE
Continue with Trileptal and mirtazapine follow-up with the psychiatrist patient also takes Xanax as needed

## 2024-05-09 NOTE — ASSESSMENT & PLAN NOTE
As mentioned in HPI patient has been on Wegovy for past 4 weeks is 0.25 mg weekly and has lost 22 pounds so far will increase the dose to 0.5 and see the response and follow-up

## 2024-06-16 DIAGNOSIS — E66.9 OBESITY (BMI 30.0-34.9): ICD-10-CM

## 2024-06-17 DIAGNOSIS — E03.9 HYPOTHYROIDISM, UNSPECIFIED TYPE: ICD-10-CM

## 2024-06-17 RX ORDER — LEVOTHYROXINE SODIUM 150 MCG
150 TABLET ORAL
Qty: 90 TABLET | Refills: 0 | Status: SHIPPED | OUTPATIENT
Start: 2024-06-17

## 2024-06-28 ENCOUNTER — APPOINTMENT (OUTPATIENT)
Dept: LAB | Facility: HOSPITAL | Age: 29
End: 2024-06-28

## 2024-06-28 DIAGNOSIS — Z00.8 ENCOUNTER FOR OTHER GENERAL EXAMINATION: ICD-10-CM

## 2024-06-28 LAB
CHOLEST SERPL-MCNC: 203 MG/DL
EST. AVERAGE GLUCOSE BLD GHB EST-MCNC: 94 MG/DL
HBA1C MFR BLD: 4.9 %
HDLC SERPL-MCNC: 54 MG/DL
LDLC SERPL CALC-MCNC: 125 MG/DL (ref 0–100)
NONHDLC SERPL-MCNC: 149 MG/DL
TRIGL SERPL-MCNC: 120 MG/DL

## 2024-06-28 PROCEDURE — 83036 HEMOGLOBIN GLYCOSYLATED A1C: CPT

## 2024-06-28 PROCEDURE — 80061 LIPID PANEL: CPT

## 2024-06-28 PROCEDURE — 36415 COLL VENOUS BLD VENIPUNCTURE: CPT

## 2024-07-05 ENCOUNTER — HOSPITAL ENCOUNTER (EMERGENCY)
Facility: HOSPITAL | Age: 29
Discharge: HOME | End: 2024-07-05
Attending: INTERNAL MEDICINE
Payer: COMMERCIAL

## 2024-07-05 ENCOUNTER — APPOINTMENT (EMERGENCY)
Dept: RADIOLOGY | Facility: HOSPITAL | Age: 29
End: 2024-07-05
Payer: COMMERCIAL

## 2024-07-05 VITALS
HEIGHT: 68 IN | DIASTOLIC BLOOD PRESSURE: 74 MMHG | RESPIRATION RATE: 14 BRPM | OXYGEN SATURATION: 98 % | BODY MASS INDEX: 32.13 KG/M2 | SYSTOLIC BLOOD PRESSURE: 115 MMHG | HEART RATE: 67 BPM | WEIGHT: 212 LBS | TEMPERATURE: 98 F

## 2024-07-05 DIAGNOSIS — R07.9 CHEST PAIN, UNSPECIFIED TYPE: Primary | ICD-10-CM

## 2024-07-05 LAB
ALBUMIN SERPL BCG-MCNC: 4.3 G/DL (ref 3.5–5)
ALP SERPL-CCNC: 64 U/L (ref 34–104)
ALT SERPL W P-5'-P-CCNC: 23 U/L (ref 7–52)
ANION GAP SERPL CALCULATED.3IONS-SCNC: 9 MMOL/L (ref 4–13)
AST SERPL W P-5'-P-CCNC: 16 U/L (ref 13–39)
BASOPHILS # BLD AUTO: 0.06 THOUSANDS/ÂΜL (ref 0–0.1)
BASOPHILS NFR BLD AUTO: 1 % (ref 0–1)
BILIRUB SERPL-MCNC: 0.25 MG/DL (ref 0.2–1)
BUN SERPL-MCNC: 16 MG/DL (ref 5–25)
CALCIUM SERPL-MCNC: 9.5 MG/DL (ref 8.4–10.2)
CARDIAC TROPONIN I PNL SERPL HS: <2 NG/L
CHLORIDE SERPL-SCNC: 104 MMOL/L (ref 96–108)
CO2 SERPL-SCNC: 25 MMOL/L (ref 21–32)
CREAT SERPL-MCNC: 0.73 MG/DL (ref 0.6–1.3)
D DIMER PPP FEU-MCNC: 0.34 UG/ML FEU
EOSINOPHIL # BLD AUTO: 0.21 THOUSAND/ÂΜL (ref 0–0.61)
EOSINOPHIL NFR BLD AUTO: 2 % (ref 0–6)
ERYTHROCYTE [DISTWIDTH] IN BLOOD BY AUTOMATED COUNT: 12.1 % (ref 11.6–15.1)
GFR SERPL CREATININE-BSD FRML MDRD: 110 ML/MIN/1.73SQ M
GLUCOSE SERPL-MCNC: 93 MG/DL (ref 65–140)
HCT VFR BLD AUTO: 39.8 % (ref 34.8–46.1)
HGB BLD-MCNC: 13.6 G/DL (ref 11.5–15.4)
IMM GRANULOCYTES # BLD AUTO: 0.03 THOUSAND/UL (ref 0–0.2)
IMM GRANULOCYTES NFR BLD AUTO: 0 % (ref 0–2)
LYMPHOCYTES # BLD AUTO: 3.79 THOUSANDS/ÂΜL (ref 0.6–4.47)
LYMPHOCYTES NFR BLD AUTO: 37 % (ref 14–44)
MCH RBC QN AUTO: 30.4 PG (ref 26.8–34.3)
MCHC RBC AUTO-ENTMCNC: 34.2 G/DL (ref 31.4–37.4)
MCV RBC AUTO: 89 FL (ref 82–98)
MONOCYTES # BLD AUTO: 0.7 THOUSAND/ÂΜL (ref 0.17–1.22)
MONOCYTES NFR BLD AUTO: 7 % (ref 4–12)
NEUTROPHILS # BLD AUTO: 5.41 THOUSANDS/ÂΜL (ref 1.85–7.62)
NEUTS SEG NFR BLD AUTO: 53 % (ref 43–75)
NRBC BLD AUTO-RTO: 0 /100 WBCS
PLATELET # BLD AUTO: 306 THOUSANDS/UL (ref 149–390)
PMV BLD AUTO: 9.2 FL (ref 8.9–12.7)
POTASSIUM SERPL-SCNC: 3.8 MMOL/L (ref 3.5–5.3)
PROT SERPL-MCNC: 7.1 G/DL (ref 6.4–8.4)
RBC # BLD AUTO: 4.48 MILLION/UL (ref 3.81–5.12)
SODIUM SERPL-SCNC: 138 MMOL/L (ref 135–147)
WBC # BLD AUTO: 10.2 THOUSAND/UL (ref 4.31–10.16)

## 2024-07-05 PROCEDURE — 99285 EMERGENCY DEPT VISIT HI MDM: CPT | Performed by: INTERNAL MEDICINE

## 2024-07-05 PROCEDURE — 93005 ELECTROCARDIOGRAM TRACING: CPT

## 2024-07-05 PROCEDURE — 71045 X-RAY EXAM CHEST 1 VIEW: CPT

## 2024-07-05 PROCEDURE — 96374 THER/PROPH/DIAG INJ IV PUSH: CPT

## 2024-07-05 PROCEDURE — 80053 COMPREHEN METABOLIC PANEL: CPT | Performed by: INTERNAL MEDICINE

## 2024-07-05 PROCEDURE — 85025 COMPLETE CBC W/AUTO DIFF WBC: CPT | Performed by: INTERNAL MEDICINE

## 2024-07-05 PROCEDURE — 99285 EMERGENCY DEPT VISIT HI MDM: CPT

## 2024-07-05 PROCEDURE — 85379 FIBRIN DEGRADATION QUANT: CPT | Performed by: INTERNAL MEDICINE

## 2024-07-05 PROCEDURE — 84484 ASSAY OF TROPONIN QUANT: CPT | Performed by: INTERNAL MEDICINE

## 2024-07-05 PROCEDURE — 36415 COLL VENOUS BLD VENIPUNCTURE: CPT | Performed by: INTERNAL MEDICINE

## 2024-07-05 RX ORDER — KETOROLAC TROMETHAMINE 30 MG/ML
15 INJECTION, SOLUTION INTRAMUSCULAR; INTRAVENOUS ONCE
Status: COMPLETED | OUTPATIENT
Start: 2024-07-05 | End: 2024-07-05

## 2024-07-05 RX ORDER — NORGESTIMATE AND ETHINYL ESTRADIOL 0.25-0.035
1 KIT ORAL DAILY
COMMUNITY
Start: 2024-06-17

## 2024-07-05 RX ADMIN — KETOROLAC TROMETHAMINE 15 MG: 30 INJECTION, SOLUTION INTRAMUSCULAR at 19:41

## 2024-07-05 NOTE — DISCHARGE INSTRUCTIONS
"Follow-up with your PMD.  Take Tylenol Motrin for pain as needed.  Labs Reviewed   CBC AND DIFFERENTIAL - Abnormal       Result Value Ref Range Status    WBC 10.20 (*) 4.31 - 10.16 Thousand/uL Final    RBC 4.48  3.81 - 5.12 Million/uL Final    Hemoglobin 13.6  11.5 - 15.4 g/dL Final    Hematocrit 39.8  34.8 - 46.1 % Final    MCV 89  82 - 98 fL Final    MCH 30.4  26.8 - 34.3 pg Final    MCHC 34.2  31.4 - 37.4 g/dL Final    RDW 12.1  11.6 - 15.1 % Final    MPV 9.2  8.9 - 12.7 fL Final    Platelets 306  149 - 390 Thousands/uL Final    nRBC 0  /100 WBCs Final    Segmented % 53  43 - 75 % Final    Immature Grans % 0  0 - 2 % Final    Lymphocytes % 37  14 - 44 % Final    Monocytes % 7  4 - 12 % Final    Eosinophils Relative 2  0 - 6 % Final    Basophils Relative 1  0 - 1 % Final    Absolute Neutrophils 5.41  1.85 - 7.62 Thousands/µL Final    Absolute Immature Grans 0.03  0.00 - 0.20 Thousand/uL Final    Absolute Lymphocytes 3.79  0.60 - 4.47 Thousands/µL Final    Absolute Monocytes 0.70  0.17 - 1.22 Thousand/µL Final    Eosinophils Absolute 0.21  0.00 - 0.61 Thousand/µL Final    Basophils Absolute 0.06  0.00 - 0.10 Thousands/µL Final   HS TROPONIN I 0HR - Normal    hs TnI 0hr <2  \"Refer to ACS Flowchart\"- see link ng/L Final    Comment:                                              Initial (time 0) result  If >=50 ng/L, Myocardial injury suggested ;  Type of myocardial injury and treatment strategy  to be determined.  If 5-49 ng/L, a delta result at 2 hours and or 4 hours will be needed to further evaluate.  If <4 ng/L, and chest pain has been >3 hours since onset, patient may qualify for discharge based on the HEART score in the ED.  If <5 ng/L and <3hours since onset of chest pain, a delta result at 2 hours will be needed to further evaluate.    HS Troponin 99th Percentile URL of a Health Population=12 ng/L with a 95% Confidence Interval of 8-18 ng/L.    Second Troponin (time 2 hours)  If calculated delta >= 20 ng/L,  " Myocardial injury suggested ; Type of myocardial injury and treatment strategy to be determined.  If 5-49 ng/L and the calculated delta is 5-19 ng/L, consult medical service for evaluation.  Continue evaluation for ischemia on ecg and other possible etiology and repeat hs troponin at 4 hours.  If delta is <5 ng/L at 2 hours, consider discharge based on risk stratification via the HEART score (if in ED), or ILANA risk score in IP/Observation.    HS Troponin 99th Percentile URL of a Health Population=12 ng/L with a 95% Confidence Interval of 8-18 ng/L.   D-DIMER, QUANTITATIVE - Normal    D-Dimer, Quant 0.34  <0.50 ug/ml FEU Final    Comment: Reference and upper limits to exclude DVT and PE are the same.  Do not use to exclude if clinical symptoms are present.  Pregnant women:  1st trimester:  <0.22 - 1.06 ug/ml FEU  2nd trimester:  <0.22 - 1.88 ug/ml FEU  3rd trimester:   0.24 - 3.28 ug/ml FEU    Note: Normal ranges may not apply to patients who are transgender, non-binary, or whose legal sex, sex at birth, and gender identity differ.     COMPREHENSIVE METABOLIC PANEL    Sodium 138  135 - 147 mmol/L Final    Potassium 3.8  3.5 - 5.3 mmol/L Final    Chloride 104  96 - 108 mmol/L Final    CO2 25  21 - 32 mmol/L Final    ANION GAP 9  4 - 13 mmol/L Final    BUN 16  5 - 25 mg/dL Final    Creatinine 0.73  0.60 - 1.30 mg/dL Final    Comment: Standardized to IDMS reference method    Glucose 93  65 - 140 mg/dL Final    Comment: If the patient is fasting, the ADA then defines impaired fasting glucose as > 100 mg/dL and diabetes as > or equal to 123 mg/dL.    Calcium 9.5  8.4 - 10.2 mg/dL Final    AST 16  13 - 39 U/L Final    ALT 23  7 - 52 U/L Final    Comment: Specimen collection should occur prior to Sulfasalazine administration due to the potential for falsely depressed results.     Alkaline Phosphatase 64  34 - 104 U/L Final    Total Protein 7.1  6.4 - 8.4 g/dL Final    Albumin 4.3  3.5 - 5.0 g/dL Final    Total Bilirubin  0.25  0.20 - 1.00 mg/dL Final    Comment: Use of this assay is not recommended for patients undergoing treatment with eltrombopag due to the potential for falsely elevated results.  N-acetyl-p-benzoquinone imine (metabolite of Acetaminophen) will generate erroneously low results in samples for patients that have taken an overdose of Acetaminophen.    eGFR 110  ml/min/1.73sq m Final    Narrative:     [employer] guidelines for Chronic Kidney Disease (CKD):     Stage 1 with normal or high GFR (GFR > 90 mL/min/1.73 square meters)    Stage 2 Mild CKD (GFR = 60-89 mL/min/1.73 square meters)    Stage 3A Moderate CKD (GFR = 45-59 mL/min/1.73 square meters)    Stage 3B Moderate CKD (GFR = 30-44 mL/min/1.73 square meters)    Stage 4 Severe CKD (GFR = 15-29 mL/min/1.73 square meters)    Stage 5 End Stage CKD (GFR <15 mL/min/1.73 square meters)  Note: GFR calculation is accurate only with a steady state creatinine   HS TROPONIN I 2HR     XR chest portable   ED Interpretation   Cxr; no acute cardiopulmonary findings.

## 2024-07-05 NOTE — ED PROVIDER NOTES
History  Chief Complaint   Patient presents with    Chest Pain     Patient c/o midsternal chest pressure that radiates to L shoulder. Pain started on Wednesday. No known cardiac hx. Pt states that deep breaths help temporarily. Denies n/v.     This is 29 years old came for having chest pain since 2 days.  Patient stated that chest pain is on the left side of the chest going into her shoulder and into the neck.  Patient states she feels relief when she takes deep breath.  Pain is pressure-like.  Patient has no nausea no vomiting.  Patient has no diaphoresis.  Patient has no history of CAD.  Patient denies smoking.  Patient states that she take OC pills for almost 10 years.  Patient denies any vaginal bleeding.  Patient denies abdominal pain.  Patient has history of hypothyroidism.        Prior to Admission Medications   Prescriptions Last Dose Informant Patient Reported? Taking?   ALPRAZolam (XANAX) 0.25 mg tablet 7/4/2024  Yes Yes   Sig: Take 0.25 mg by mouth 2 (two) times a day as needed for anxiety   Hallie 0.25-35 MG-MCG per tablet   Yes Yes   Sig: Take 1 tablet by mouth daily   OXcarbazepine (TRILEPTAL) 300 mg tablet 7/4/2024  Yes Yes   Sig: Take 300 mg by mouth 2 (two) times a day   Semaglutide-Weight Management (WEGOVY) 0.5 MG/0.5ML Past Month  No Yes   Sig: Inject 0.5 mL (0.5 mg total) under the skin once a week   Synthroid 150 MCG tablet 7/5/2024  No Yes   Sig: Take 1 tablet (150 mcg total) by mouth daily in the early morning   cholecalciferol (VITAMIN D3) 1,000 units tablet Past Month  Yes Yes   Sig: Take 1,000 Units by mouth 2 (two) times a day   fluticasone (FLONASE) 50 mcg/act nasal spray   No No   Sig: SPRAY 2 SPRAYS INTO EACH NOSTRIL EVERY DAY   mirtazapine (REMERON) 30 mg tablet 7/4/2024  No Yes   Sig: Take 1 tablet (30 mg total) by mouth daily at bedtime      Facility-Administered Medications: None       Past Medical History:   Diagnosis Date    Hypothyroidism        History reviewed. No pertinent  surgical history.    History reviewed. No pertinent family history.  I have reviewed and agree with the history as documented.    E-Cigarette/Vaping    E-Cigarette Use Never User      E-Cigarette/Vaping Substances    Nicotine No     THC No     CBD No     Flavoring No     Other No     Unknown No      Social History     Tobacco Use    Smoking status: Never    Smokeless tobacco: Never   Vaping Use    Vaping status: Never Used   Substance Use Topics    Drug use: Never       Review of Systems   Constitutional:  Negative for chills and fever.   HENT:  Negative for congestion, ear pain and sore throat.    Eyes:  Negative for pain and visual disturbance.   Respiratory:  Negative for cough and shortness of breath.    Cardiovascular:  Positive for chest pain. Negative for palpitations.   Gastrointestinal:  Negative for abdominal pain and vomiting.   Genitourinary:  Negative for dysuria and hematuria.   Musculoskeletal:  Negative for arthralgias and back pain.   Skin:  Negative for color change and rash.   Neurological:  Negative for seizures and syncope.   All other systems reviewed and are negative.      Physical Exam  Physical Exam  Vitals and nursing note reviewed.   Constitutional:       General: She is not in acute distress.     Appearance: She is well-developed. She is not ill-appearing, toxic-appearing or diaphoretic.   HENT:      Head: Normocephalic and atraumatic.   Eyes:      Conjunctiva/sclera: Conjunctivae normal.   Neck:      Thyroid: No thyromegaly.      Vascular: No hepatojugular reflux or JVD.      Trachea: No tracheal deviation.   Cardiovascular:      Rate and Rhythm: Normal rate and regular rhythm.      Pulses:           Carotid pulses are 2+ on the right side and 2+ on the left side.       Radial pulses are 2+ on the right side and 2+ on the left side.        Dorsalis pedis pulses are 2+ on the right side and 2+ on the left side.        Posterior tibial pulses are 2+ on the right side and 2+ on the left  side.      Heart sounds: Normal heart sounds. No murmur heard.  Pulmonary:      Effort: Pulmonary effort is normal. No tachypnea, accessory muscle usage or respiratory distress.      Breath sounds: Normal breath sounds. No stridor. No decreased breath sounds, wheezing, rhonchi or rales.   Chest:      Chest wall: No mass, deformity, tenderness, crepitus or edema. There is no dullness to percussion.   Abdominal:      General: There is no abdominal bruit.      Palpations: Abdomen is soft. There is no fluid wave, hepatomegaly or mass.      Tenderness: There is no abdominal tenderness. There is no guarding or rebound.   Musculoskeletal:         General: No swelling. Normal range of motion.      Cervical back: Normal range of motion and neck supple.      Right lower leg: No tenderness. No edema.      Left lower leg: No tenderness. No edema.   Lymphadenopathy:      Cervical: No cervical adenopathy.   Skin:     General: Skin is warm and dry.      Capillary Refill: Capillary refill takes less than 2 seconds.   Neurological:      Mental Status: She is alert and oriented to person, place, and time.   Psychiatric:         Mood and Affect: Mood normal.         Vital Signs  ED Triage Vitals   Temperature Pulse Respirations Blood Pressure SpO2   07/05/24 1812 07/05/24 1812 07/05/24 1812 07/05/24 1812 07/05/24 1812   98 °F (36.7 °C) 70 18 127/77 98 %      Temp Source Heart Rate Source Patient Position - Orthostatic VS BP Location FiO2 (%)   07/05/24 1812 07/05/24 1812 07/05/24 1812 07/05/24 1812 --   Oral Monitor Lying Left arm       Pain Score       07/05/24 1932       7           Vitals:    07/05/24 1812 07/05/24 1932   BP: 127/77 115/74   Pulse: 70 67   Patient Position - Orthostatic VS: Lying Sitting         Visual Acuity      ED Medications  Medications   ketorolac (TORADOL) injection 15 mg (15 mg Intravenous Given 7/5/24 1941)       Diagnostic Studies  Results Reviewed       Procedure Component Value Units Date/Time     D-dimer, quantitative [[id number]]  (Normal) Collected: 07/05/24 1849    Lab Status: Final result Specimen: Blood from Arm, Left Updated: 07/05/24 1921     D-Dimer, Quant 0.34 ug/ml FEU     HS Troponin 0hr (reflex protocol) [[id number]]  (Normal) Collected: 07/05/24 1823    Lab Status: Final result Specimen: Blood from Arm, Right Updated: 07/05/24 1852     hs TnI 0hr <2 ng/L     Comprehensive metabolic panel [[id number]] Collected: 07/05/24 1823    Lab Status: Final result Specimen: Blood from Arm, Right Updated: 07/05/24 1847     Sodium 138 mmol/L      Potassium 3.8 mmol/L      Chloride 104 mmol/L      CO2 25 mmol/L      ANION GAP 9 mmol/L      BUN 16 mg/dL      Creatinine 0.73 mg/dL      Glucose 93 mg/dL      Calcium 9.5 mg/dL      AST 16 U/L      ALT 23 U/L      Alkaline Phosphatase 64 U/L      Total Protein 7.1 g/dL      Albumin 4.3 g/dL      Total Bilirubin 0.25 mg/dL      eGFR 110 ml/min/1.73sq m     Narrative:      National Kidney Disease Foundation guidelines for Chronic Kidney Disease (CKD):     Stage 1 with normal or high GFR (GFR > 90 mL/min/1.73 square meters)    Stage 2 Mild CKD (GFR = 60-89 mL/min/1.73 square meters)    Stage 3A Moderate CKD (GFR = 45-59 mL/min/1.73 square meters)    Stage 3B Moderate CKD (GFR = 30-44 mL/min/1.73 square meters)    Stage 4 Severe CKD (GFR = 15-29 mL/min/1.73 square meters)    Stage 5 End Stage CKD (GFR <15 mL/min/1.73 square meters)  Note: GFR calculation is accurate only with a steady state creatinine    CBC and differential [[id number]]  (Abnormal) Collected: 07/05/24 1823    Lab Status: Final result Specimen: Blood from Arm, Right Updated: 07/05/24 1830     WBC 10.20 Thousand/uL      RBC 4.48 Million/uL      Hemoglobin 13.6 g/dL      Hematocrit 39.8 %      MCV 89 fL      MCH 30.4 pg      MCHC 34.2 g/dL      RDW 12.1 %      MPV 9.2 fL      Platelets 306 Thousands/uL      nRBC 0 /100 WBCs      Segmented % 53 %      Immature Grans % 0 %      Lymphocytes % 37 %       Monocytes % 7 %      Eosinophils Relative 2 %      Basophils Relative 1 %      Absolute Neutrophils 5.41 Thousands/µL      Absolute Immature Grans 0.03 Thousand/uL      Absolute Lymphocytes 3.79 Thousands/µL      Absolute Monocytes 0.70 Thousand/µL      Eosinophils Absolute 0.21 Thousand/µL      Basophils Absolute 0.06 Thousands/µL                    XR chest portable   ED Interpretation by Prem Alan MD (07/05 1850)   Cxr; no acute cardiopulmonary findings.      Final Result by Sara Cote MD (07/05 2036)      No acute cardiopulmonary disease.            Workstation performed: [id number]                    Procedures  ECG 12 Lead Documentation Only    Date/Time: 7/5/2024 6:45 PM    Performed by: Prem Alan MD  Authorized by: Prem Alan MD    Indications / Diagnosis:  CHEST PAIN  ECG reviewed by me, the ED Provider: yes    Patient location:  ED  Previous ECG:     Previous ECG:  Compared to current    Comparison to cardiac monitor: Yes    Interpretation:     Interpretation: normal    Rate:     ECG rate:  60    ECG rate assessment: normal    Rhythm:     Rhythm: sinus rhythm    Ectopy:     Ectopy: none    QRS:     QRS axis:  Normal    QRS intervals:  Normal  Conduction:     Conduction: normal    ST segments:     ST segments:  Normal  T waves:     T waves: normal             ED Course             HEART Risk Score      Flowsheet Row Most Recent Value   Heart Score Risk Calculator    History 0 Filed at: 07/05/2024 1923   ECG 0 Filed at: 07/05/2024 1923   Age 0 Filed at: 07/05/2024 1923   Risk Factors 0 Filed at: 07/05/2024 1923   Troponin 0 Filed at: 07/05/2024 1923   HEART Score 0 Filed at: 07/05/2024 1923                          SBIRT 22yo+      Flowsheet Row Most Recent Value   Initial Alcohol Screen: US AUDIT-C     1. How often do you have a drink containing alcohol? 0 Filed at: 07/05/2024 1809   2. How many drinks containing alcohol do you have on a typical day you are drinking?   0 Filed at: 07/05/2024 1809   3b. FEMALE Any Age, or MALE 65+: How often do you have 4 or more drinks on one occassion? 0 Filed at: 07/05/2024 1809   Audit-C Score 0 Filed at: 07/05/2024 1809   DELFIN: How many times in the past year have you...    Used an illegal drug or used a prescription medication for non-medical reasons? Never Filed at: 07/05/2024 1809                      Medical Decision Making  This is a 29 years old came for having chest pain for 2 days.  Patient stated chest pain is continuous.  Has has pain on the left side of the chest radiated to the left shoulder and to the neck.  Patient has no SOB, nausea vomiting diaphoresis.  Patient has no history of CAD.  patient she take OC pills for almost 10 years.  Patient denies any vaginal bleeding.  Physical exam shows no pertinent positive findings.  EKG shows NSR with 68/min no ischemic changes normal EKG. reviewing the labs including CBC, CMP came back within normal limits.  Troponin<2, D-Dimer 0.34.  CXR shows no acute cardiopulmonary findings.  There is no need to repeat the troponin for delta 1 as patient has this chest pain since 2 days ago.  Differential diagnosis include but not limited to; musculoskeletal pain, muscle spasm, costochondritis, anxiety, GERD, CAD, pneumonia.    Amount and/or Complexity of Data Reviewed  Labs: ordered.     Details: eviewing the labs including CBC, CMP came back within normal limits.  Troponin<2, D-Dimer 0.34.   Radiology: ordered and independent interpretation performed.     Details: CXR shows no acute cardiopulmonary findings.  ECG/medicine tests: ordered and independent interpretation performed.     Details: EKG; NSR rate 60/min no ischemic changes normal EKG.    Risk  Prescription drug management.             Disposition  Final diagnoses:   Chest pain, unspecified type     Time reflects when diagnosis was documented in both MDM as applicable and the Disposition within this note       Time User Action Codes  Description Comment    7/5/2024  7:26 PM Prem Alan Add [R07.9] Chest pain, unspecified type           ED Disposition       ED Disposition   Discharge    Condition   Stable    Date/Time   Fri Jul 5, 2024 1926    Comment   Anali YUN Red discharge to home/self care.                   Follow-up Information       Follow up With Specialties Details Why Contact Info    Rasta Benoit MD Internal Medicine In 1 week  [address] [facility] [address]  [phone]              Discharge Medication List as of 7/5/2024  7:27 PM        CONTINUE these medications which have NOT CHANGED    Details   ALPRAZolam (XANAX) 0.25 mg tablet Take 0.25 mg by mouth 2 (two) times a day as needed for anxiety, Historical Med      cholecalciferol (VITAMIN D3) 1,000 units tablet Take 1,000 Units by mouth 2 (two) times a day, Historical Med      Hallie 0.25-35 MG-MCG per tablet Take 1 tablet by mouth daily, Starting Mon 6/17/2024, Historical Med      mirtazapine (REMERON) 30 mg tablet Take 1 tablet (30 mg total) by mouth daily at bedtime, Starting u 7/20/2023, No Print      OXcarbazepine (TRILEPTAL) 300 mg tablet Take 300 mg by mouth 2 (two) times a day, Starting Mon 5/22/2023, Historical Med      Semaglutide-Weight Management (WEGOVY) 0.5 MG/0.5ML Inject 0.5 mL (0.5 mg total) under the skin once a week, Starting Sun 6/16/2024, Normal      Synthroid 150 MCG tablet Take 1 tablet (150 mcg total) by mouth daily in the early morning, Starting Mon 6/17/2024, Normal      fluticasone (FLONASE) 50 mcg/act nasal spray SPRAY 2 SPRAYS INTO EACH NOSTRIL EVERY DAY, Normal             No discharge procedures on file.    PDMP Review       None            ED Provider  Electronically Signed by             Prem Alan MD  07/06/24 4432

## 2024-07-05 NOTE — ED NOTES
Discharge reviewed with patient. Patient verbalized understanding and no further questions at this time. Patient ambulatory off unit with steady gait.      Kate Desai  07/05/24 1950

## 2024-07-06 LAB
ATRIAL RATE: 60 BPM
P AXIS: 25 DEGREES
PR INTERVAL: 164 MS
QRS AXIS: 44 DEGREES
QRSD INTERVAL: 90 MS
QT INTERVAL: 448 MS
QTC INTERVAL: 448 MS
T WAVE AXIS: 26 DEGREES
VENTRICULAR RATE: 60 BPM

## 2024-07-06 PROCEDURE — 93010 ELECTROCARDIOGRAM REPORT: CPT | Performed by: INTERNAL MEDICINE

## 2024-07-24 ENCOUNTER — OFFICE VISIT (OUTPATIENT)
Dept: OBGYN CLINIC | Facility: CLINIC | Age: 29
End: 2024-07-24
Payer: COMMERCIAL

## 2024-07-24 VITALS — DIASTOLIC BLOOD PRESSURE: 68 MMHG | HEIGHT: 68 IN | SYSTOLIC BLOOD PRESSURE: 114 MMHG | BODY MASS INDEX: 32.23 KG/M2

## 2024-07-24 DIAGNOSIS — Z30.41 ORAL CONTRACEPTIVE PILL SURVEILLANCE: ICD-10-CM

## 2024-07-24 DIAGNOSIS — Z01.419 ENCOUNTER FOR ANNUAL ROUTINE GYNECOLOGICAL EXAMINATION: Primary | ICD-10-CM

## 2024-07-24 DIAGNOSIS — Z72.3 INADEQUATE EXERCISE: ICD-10-CM

## 2024-07-24 DIAGNOSIS — E58 DIETARY CALCIUM DEFICIENCY: ICD-10-CM

## 2024-07-24 PROBLEM — Z80.3 FAMILY HISTORY OF BREAST CANCER IN MOTHER: Status: ACTIVE | Noted: 2023-06-19

## 2024-07-24 PROCEDURE — S0610 ANNUAL GYNECOLOGICAL EXAMINA: HCPCS | Performed by: OBSTETRICS & GYNECOLOGY

## 2024-07-24 RX ORDER — NORGESTIMATE AND ETHINYL ESTRADIOL 0.25-0.035
1 KIT ORAL DAILY
Qty: 84 TABLET | Refills: 4 | Status: SHIPPED | OUTPATIENT
Start: 2024-07-24

## 2024-07-24 NOTE — PROGRESS NOTES
NEW PATIENT    Pt is a 29 y.o. No obstetric history on file. with Patient's last menstrual period was 07/08/2024 (approximate). Using OCPs  for BC presents for preventive care.   She notes the different partner since her last STI evaluation. In her lifetime she has been involved with >5 partners .   Safe sexual practices (monogomy, condoms) are followed consistently.         She does  feel safe in the relationship.  She does feel safe in her home.    Her calcium intake encompasses  multivitamin, milk (cow, goat, almond, cashew, soy, etc), and cheese for a total of 4-5 servings daily on average.  She does take additional Vitamin D (MVI or supplement).    She exercises 2-3 times per week.  Her menses occur every 28 Days, last 3-4 days and require regular tampons every 7-8 hours.  Menstrual History:  OB History    No obstetric history on file.        Menarche age: 11  Patient's last menstrual period was 07/08/2024 (approximate).          She has completed the HPV vaccine series appropriate for age.  tobacco use : does not use tobacco              Colonoscopy: not indicated  Mammogram: not indicated  Pap: 4/15/2022-wnl, repeat next year  Declines ch/mj screening    Past Medical History:   Diagnosis Date    HPV vaccine counseling     completed series    Hypothyroidism     Varicella vaccine        Past Surgical History:   Procedure Laterality Date    BREAST LUMPECTOMY Left     benign mass       OB History   No obstetric history on file.            Current Outpatient Medications:     ALPRAZolam (XANAX) 0.25 mg tablet, Take 0.25 mg by mouth 2 (two) times a day as needed for anxiety, Disp: , Rfl:     cholecalciferol (VITAMIN D3) 1,000 units tablet, Take 1,000 Units by mouth 2 (two) times a day, Disp: , Rfl:     fluticasone (FLONASE) 50 mcg/act nasal spray, SPRAY 2 SPRAYS INTO EACH NOSTRIL EVERY DAY, Disp: 48 mL, Rfl: 1    Hallie 0.25-35 MG-MCG per tablet, Take 1 tablet by mouth daily, Disp: 84 tablet, Rfl: 4    mirtazapine  (REMERON) 30 mg tablet, Take 1 tablet (30 mg total) by mouth daily at bedtime, Disp: , Rfl:     OXcarbazepine (TRILEPTAL) 300 mg tablet, Take 300 mg by mouth 2 (two) times a day, Disp: , Rfl:     Synthroid 150 MCG tablet, Take 1 tablet (150 mcg total) by mouth daily in the early morning, Disp: 90 tablet, Rfl: 0    Semaglutide-Weight Management (WEGOVY) 0.5 MG/0.5ML, Inject 0.5 mL (0.5 mg total) under the skin once a week (Patient not taking: Reported on 7/24/2024), Disp: 2 mL, Rfl: 0    No Known Allergies    Social History     Socioeconomic History    Marital status: Single     Spouse name: None    Number of children: 0    Years of education: None    Highest education level: Master's degree (e.g., MA, MS, Hilaria, MEd, MSW, ENEDINA)   Occupational History    Occupation:    Tobacco Use    Smoking status: Never    Smokeless tobacco: Never   Vaping Use    Vaping status: Never Used   Substance and Sexual Activity    Alcohol use: Not Currently     Alcohol/week: 0.0 - 3.0 standard drinks of alcohol     Comment: Occasional    Drug use: Never    Sexual activity: Yes     Partners: Male     Birth control/protection: OCP, Condom Male     Comment: lifetime partners: 14; current partner: 4 months   Other Topics Concern    None   Social History Narrative    Mandaeism: No preference    Accepts blood products        Exercise: 3x/week walking or running    Calcium: 3 cheese/day, 1 yogurt once daily, multivitamin 3x/week     Social Determinants of Health     Financial Resource Strain: Not on file   Food Insecurity: Not on file   Transportation Needs: Not on file   Physical Activity: Not on file   Stress: Not on file   Social Connections: Not on file   Intimate Partner Violence: Not on file   Housing Stability: Not on file       Family History   Problem Relation Age of Onset    Breast cancer Mother 57        BRCA negative    Hypertension Father     Liver cancer Brother 16        passed away age 18    Ovarian cancer Maternal  "Grandmother     No Known Problems Maternal Grandfather     No Known Problems Paternal Grandmother     No Known Problems Paternal Grandfather     Colon cancer Neg Hx        Blood pressure 114/68, height 5' 8\" (1.727 m), last menstrual period 07/08/2024. and Body mass index is 32.23 kg/m².    Physical Exam  Constitutional:       General: She is not in acute distress.     Appearance: Normal appearance. She is well-developed. She is not ill-appearing.   HENT:      Head: Normocephalic and atraumatic.   Eyes:      Extraocular Movements: Extraocular movements intact.      Conjunctiva/sclera: Conjunctivae normal.   Neck:      Thyroid: No thyromegaly.      Trachea: No tracheal deviation.   Cardiovascular:      Rate and Rhythm: Normal rate and regular rhythm.      Heart sounds: Normal heart sounds.   Pulmonary:      Effort: Pulmonary effort is normal. No respiratory distress.      Breath sounds: Normal breath sounds. No stridor. No wheezing or rales.   Abdominal:      General: Bowel sounds are normal. There is no distension.      Palpations: Abdomen is soft. There is no mass.      Tenderness: There is no abdominal tenderness. There is no guarding or rebound.   Musculoskeletal:         General: No tenderness. Normal range of motion.      Cervical back: Normal range of motion and neck supple.   Lymphadenopathy:      Cervical: No cervical adenopathy.   Skin:     General: Skin is warm.      Findings: No erythema or rash.   Neurological:      Mental Status: She is alert and oriented to person, place, and time.   Psychiatric:         Mood and Affect: Mood normal.         Behavior: Behavior normal.         Thought Content: Thought content normal.         Judgment: Judgment normal.         Breasts: breasts appear normal, no suspicious masses, no skin or nipple changes or axillary nodes, symmetric fibrous changes in both upper outer quadrants.    vulva: normal external genitalia for age and no lesions, masses, epithelial changes, or " exudate  vagina: color pink and rugae  well formed rugae  cervix: nullip and no lesions   uterus: NSSC, AF, NT, mobile  adnexa: no masses or tenderness      A/P:  Pt is a 29 y.o. No obstetric history on file. with      Anali was seen today for new patient visit.    Diagnoses and all orders for this visit:    Encounter for annual routine gynecological examination  -normal examination  -pap up to date    Oral contraceptive pill surveillance  -     Hallie 0.25-35 MG-MCG per tablet; Take 1 tablet by mouth daily    Dietary calcium deficiency  Patient advised recommendation of daily dietary calcium of 1000 mg calcium.     Inadequate exercise  Patient advised recommendation of exercise 5 times per week for 30 minutes.    BMI 32.0-32.9,adult  Patient advised recommendation of BMI to be between 19-25.

## 2024-07-31 NOTE — PROGRESS NOTES
Office Visit Note  24     Anali Red 29 y.o. female MRN: [id number]  : 1995    Assessment:     1. Atypical chest pain  Assessment & Plan:  Patient with atypical chest pain as mentioned in the history of present illness left-sided radiating to the shoulder area workup being negative so far in the emergency room.  Exam there is no tenderness noted in the rib cage area movements of the shoulder and neck are not restricted.  Will have her seen once by the cardiologist get an echocardiogram done after that if necessary will also do the CT scan of the chest for further evaluation and treatment.    Patient was started on semaglutide injection about 3 months ago started with initial dose of 0.25 which she had received for couple of months and subsequently it was increased to 0.5 mg.  When she developed this pain she stopped taking it but resumed it again last week.  Orders:  -     Ambulatory referral to Cardiology; Future  2. Obesity (BMI 30.0-34.9)  -     Semaglutide-Weight Management (WEGOVY) 0.5 MG/0.5ML; Inject 0.5 mL (0.5 mg total) under the skin once a week  3. Acquired hypothyroidism  Assessment & Plan:  Patient with hypothyroidism last TSH level was 2.28 continue with the same dose of Synthroid 150 mcg daily brand-name.  4. Anxiety  Assessment & Plan:  Patient is being followed by the psychiatrist currently taking Trileptal, mirtazapine and Xanax as needed there was no change in the medications in the recent past  5. BMI 32.0-32.9,adult  Assessment & Plan:  Patient's BMI is 32.23 on Wegovy 0.5 mg weekly we will continue  6. Hypercholesterolemia  Assessment & Plan:  Last cholesterol 198 LDL at 125 again emphasized regarding diet exercise lifestyle modification.             Discussion Summary and Plan:  Today's care plan and medications were reviewed with patient in detail and all their questions answered to their satisfaction.    Chief Complaint   Patient presents with    Follow-up       Subjective:  Patient is coming here for evaluation regarding symptoms of chest pain in the left side upper area radiating into the clavicle or shoulder area about 3 weeks ago she developed this pain ended up in the emergency room since it was there for couple of days.  Pain is sharp and sometimes heavy feeling it is not associated with sweating palpitations or shortness of breath.  The pain persisted for few more days after she got discharged from the emergency room then subsided.  And she developed the pain again 3 days back similar kind she was at the gym also but she continued to workout and then gradually subsided she has taken an aspirin at that time.  Again no associated symptoms of shortness of breath palpitations or sweating.  Patient did not do any kind of activity which involves pushing pulling lifting.  I reviewed the report from the emergency room and has been negative including EKG chest x-ray D-dimer troponins chemistry.        The following portions of the patient's history were reviewed and updated as appropriate: allergies, current medications, past family history, past medical history, past social history, past surgical history and problem list.    Review of Systems   Constitutional:  Negative for chills and fever.   HENT:  Negative for ear pain and sore throat.    Eyes:  Negative for pain and visual disturbance.   Respiratory:  Negative for cough and shortness of breath.    Cardiovascular:  Negative for chest pain and palpitations.   Gastrointestinal:  Negative for abdominal pain and vomiting.   Genitourinary:  Negative for dysuria and hematuria.   Musculoskeletal:  Negative for arthralgias and back pain.   Skin:  Negative for color change and rash.   Neurological:  Negative for seizures and syncope.   All other systems reviewed and are negative.        Historical Information   Patient Active Problem List   Diagnosis    Acquired hypothyroidism    Anxiety    Hypercholesterolemia    BMI  32.0-32.9,adult    Family history of breast cancer in mother    Atypical chest pain     Past Medical History:   Diagnosis Date    HPV vaccine counseling     completed series    Hypothyroidism     Varicella vaccine      Past Surgical History:   Procedure Laterality Date    BREAST LUMPECTOMY Left     benign mass     Social History     Substance and Sexual Activity   Alcohol Use Not Currently    Alcohol/week: 0.0 - 3.0 standard drinks of alcohol    Comment: Occasional     Social History     Substance and Sexual Activity   Drug Use Never     Social History     Tobacco Use   Smoking Status Never   Smokeless Tobacco Never     Family History   Problem Relation Age of Onset    Breast cancer Mother 57        BRCA negative    Hypertension Father     Liver cancer Brother 16        passed away age 18    Ovarian cancer Maternal Grandmother     No Known Problems Maternal Grandfather     No Known Problems Paternal Grandmother     No Known Problems Paternal Grandfather     Colon cancer Neg Hx      Health Maintenance Due   Topic    Hepatitis C Screening     HIV Screening     Annual Physical     Cervical Cancer Screening     COVID-19 Vaccine (5 - 2023-24 season)    Influenza Vaccine (1)      Meds/Allergies       Current Outpatient Medications:     ALPRAZolam (XANAX) 0.25 mg tablet, Take 0.25 mg by mouth 2 (two) times a day as needed for anxiety, Disp: , Rfl:     cholecalciferol (VITAMIN D3) 1,000 units tablet, Take 1,000 Units by mouth 2 (two) times a day, Disp: , Rfl:     fluticasone (FLONASE) 50 mcg/act nasal spray, SPRAY 2 SPRAYS INTO EACH NOSTRIL EVERY DAY, Disp: 48 mL, Rfl: 1    Hallie 0.25-35 MG-MCG per tablet, Take 1 tablet by mouth daily, Disp: 84 tablet, Rfl: 4    mirtazapine (REMERON) 30 mg tablet, Take 1 tablet (30 mg total) by mouth daily at bedtime, Disp: , Rfl:     OXcarbazepine (TRILEPTAL) 300 mg tablet, Take 300 mg by mouth 2 (two) times a day, Disp: , Rfl:     Semaglutide-Weight Management (WEGOVY) 0.5 MG/0.5ML, Inject  "0.5 mL (0.5 mg total) under the skin once a week, Disp: 2 mL, Rfl: 0    Synthroid 150 MCG tablet, Take 1 tablet (150 mcg total) by mouth daily in the early morning, Disp: 90 tablet, Rfl: 0      Objective:    Vitals:   /68 (BP Location: Right arm, Patient Position: Sitting, Cuff Size: Standard)   Pulse 79   Ht 5' 8\" (1.727 m)   Wt 96.2 kg (212 lb)   LMP 07/08/2024 (Approximate)   SpO2 98%   BMI 32.23 kg/m²   Body mass index is 32.23 kg/m².  Vitals:    08/01/24 1733   Weight: 96.2 kg (212 lb)       Physical Exam    Lab Review   Admission on 07/05/2024, Discharged on 07/05/2024   Component Date Value Ref Range Status    Ventricular Rate 07/05/2024 60  BPM Final    Atrial Rate 07/05/2024 60  BPM Final    LA Interval 07/05/2024 164  ms Final    QRSD Interval 07/05/2024 90  ms Final    QT Interval 07/05/2024 448  ms Final    QTC Interval 07/05/2024 448  ms Final    P Axis 07/05/2024 25  degrees Final    QRS Axis 07/05/2024 44  degrees Final    T Wave Axis 07/05/2024 26  degrees Final    WBC 07/05/2024 10.20 (H)  4.31 - 10.16 Thousand/uL Final    RBC 07/05/2024 4.48  3.81 - 5.12 Million/uL Final    Hemoglobin 07/05/2024 13.6  11.5 - 15.4 g/dL Final    Hematocrit 07/05/2024 39.8  34.8 - 46.1 % Final    MCV 07/05/2024 89  82 - 98 fL Final    MCH 07/05/2024 30.4  26.8 - 34.3 pg Final    MCHC 07/05/2024 34.2  31.4 - 37.4 g/dL Final    RDW 07/05/2024 12.1  11.6 - 15.1 % Final    MPV 07/05/2024 9.2  8.9 - 12.7 fL Final    Platelets 07/05/2024 306  149 - 390 Thousands/uL Final    nRBC 07/05/2024 0  /100 WBCs Final    Segmented % 07/05/2024 53  43 - 75 % Final    Immature Grans % 07/05/2024 0  0 - 2 % Final    Lymphocytes % 07/05/2024 37  14 - 44 % Final    Monocytes % 07/05/2024 7  4 - 12 % Final    Eosinophils Relative 07/05/2024 2  0 - 6 % Final    Basophils Relative 07/05/2024 1  0 - 1 % Final    Absolute Neutrophils 07/05/2024 5.41  1.85 - 7.62 Thousands/µL Final    Absolute Immature Grans 07/05/2024 0.03  0.00 - " "0.20 Thousand/uL Final    Absolute Lymphocytes 07/05/2024 3.79  0.60 - 4.47 Thousands/µL Final    Absolute Monocytes 07/05/2024 0.70  0.17 - 1.22 Thousand/µL Final    Eosinophils Absolute 07/05/2024 0.21  0.00 - 0.61 Thousand/µL Final    Basophils Absolute 07/05/2024 0.06  0.00 - 0.10 Thousands/µL Final    Sodium 07/05/2024 138  135 - 147 mmol/L Final    Potassium 07/05/2024 3.8  3.5 - 5.3 mmol/L Final    Chloride 07/05/2024 104  96 - 108 mmol/L Final    CO2 07/05/2024 25  21 - 32 mmol/L Final    ANION GAP 07/05/2024 9  4 - 13 mmol/L Final    BUN 07/05/2024 16  5 - 25 mg/dL Final    Creatinine 07/05/2024 0.73  0.60 - 1.30 mg/dL Final    Standardized to IDMS reference method    Glucose 07/05/2024 93  65 - 140 mg/dL Final    If the patient is fasting, the ADA then defines impaired fasting glucose as > 100 mg/dL and diabetes as > or equal to 123 mg/dL.    Calcium 07/05/2024 9.5  8.4 - 10.2 mg/dL Final    AST 07/05/2024 16  13 - 39 U/L Final    ALT 07/05/2024 23  7 - 52 U/L Final    Specimen collection should occur prior to Sulfasalazine administration due to the potential for falsely depressed results.     Alkaline Phosphatase 07/05/2024 64  34 - 104 U/L Final    Total Protein 07/05/2024 7.1  6.4 - 8.4 g/dL Final    Albumin 07/05/2024 4.3  3.5 - 5.0 g/dL Final    Total Bilirubin 07/05/2024 0.25  0.20 - 1.00 mg/dL Final    Use of this assay is not recommended for patients undergoing treatment with eltrombopag due to the potential for falsely elevated results.  N-acetyl-p-benzoquinone imine (metabolite of Acetaminophen) will generate erroneously low results in samples for patients that have taken an overdose of Acetaminophen.    eGFR 07/05/2024 110  ml/min/1.73sq m Final    hs TnI 0hr 07/05/2024 <2  \"Refer to ACS Flowchart\"- see link ng/L Final    Comment:                                              Initial (time 0) result  If >=50 ng/L, Myocardial injury suggested ;  Type of myocardial injury and treatment strategy  to " be determined.  If 5-49 ng/L, a delta result at 2 hours and or 4 hours will be needed to further evaluate.  If <4 ng/L, and chest pain has been >3 hours since onset, patient may qualify for discharge based on the HEART score in the ED.  If <5 ng/L and <3hours since onset of chest pain, a delta result at 2 hours will be needed to further evaluate.    HS Troponin 99th Percentile URL of a Health Population=12 ng/L with a 95% Confidence Interval of 8-18 ng/L.    Second Troponin (time 2 hours)  If calculated delta >= 20 ng/L,  Myocardial injury suggested ; Type of myocardial injury and treatment strategy to be determined.  If 5-49 ng/L and the calculated delta is 5-19 ng/L, consult medical service for evaluation.  Continue evaluation for ischemia on ecg and other possible etiology and repeat hs troponin at 4 hours.  If delta                            is <5 ng/L at 2 hours, consider discharge based on risk stratification via the HEART score (if in ED), or ILANA risk score in IP/Observation.    HS Troponin 99th Percentile URL of a Health Population=12 ng/L with a 95% Confidence Interval of 8-18 ng/L.    D-Dimer, Quant 07/05/2024 0.34  <0.50 ug/ml FEU Final    Reference and upper limits to exclude DVT and PE are the same.  Do not use to exclude if clinical symptoms are present.  Pregnant women:  1st trimester:  <0.22 - 1.06 ug/ml FEU  2nd trimester:  <0.22 - 1.88 ug/ml FEU  3rd trimester:   0.24 - 3.28 ug/ml FEU    Note: Normal ranges may not apply to patients who are transgender, non-binary, or whose legal sex, sex at birth, and gender identity differ.     Appointment on 06/28/2024   Component Date Value Ref Range Status    Hemoglobin A1C 06/28/2024 4.9  Normal 4.0-5.6%; PreDiabetic 5.7-6.4%; Diabetic >=6.5%; Glycemic control for adults with diabetes <7.0% % Final    EAG 06/28/2024 94  mg/dl Final    Cholesterol 06/28/2024 203 (H)  See Comment mg/dL Final    Cholesterol:         Pediatric <18 Years        Desirable          " <170 mg/dL      Borderline High    170-199 mg/dL      High               >=200 mg/dL        Adult >=18 Years            Desirable         <200 mg/dL      Borderline High   200-239 mg/dL      High              >239 mg/dL      Triglycerides 06/28/2024 120  See Comment mg/dL Final    Triglyceride:     0-9Y            <75mg/dL     10Y-17Y         <90 mg/dL       >=18Y     Normal          <150 mg/dL     Borderline High 150-199 mg/dL     High            200-499 mg/dL        Very High       >499 mg/dL    Specimen collection should occur prior to Metamizole administration due to the potential for falsely depressed results.    HDL, Direct 06/28/2024 54  >=50 mg/dL Final    LDL Calculated 06/28/2024 125 (H)  0 - 100 mg/dL Final    LDL Cholesterol:     Optimal           <100 mg/dl     Near Optimal      100-129 mg/dl     Above Optimal       Borderline High 130-159 mg/dl       High            160-189 mg/dl       Very High       >189 mg/dl         This screening LDL is a calculated result.   It does not have the accuracy of the Direct Measured LDL in the monitoring of patients with hyperlipidemia and/or statin therapy.   Direct Measure LDL (SSZ875) must be ordered separately in these patients.    Non-HDL-Chol (CHOL-HDL) 06/28/2024 149  mg/dl Final         Rasta Benoit MD        \"This note has been constructed using a voice recognition system.Therefore there may be syntax, spelling, and/or grammatical errors. Please call if you have any questions. \"  "

## 2024-08-01 ENCOUNTER — OFFICE VISIT (OUTPATIENT)
Dept: FAMILY MEDICINE CLINIC | Facility: CLINIC | Age: 29
End: 2024-08-01
Payer: COMMERCIAL

## 2024-08-01 VITALS
WEIGHT: 212 LBS | HEART RATE: 79 BPM | BODY MASS INDEX: 32.13 KG/M2 | OXYGEN SATURATION: 98 % | DIASTOLIC BLOOD PRESSURE: 68 MMHG | HEIGHT: 68 IN | SYSTOLIC BLOOD PRESSURE: 116 MMHG

## 2024-08-01 DIAGNOSIS — R07.89 ATYPICAL CHEST PAIN: Primary | ICD-10-CM

## 2024-08-01 DIAGNOSIS — E78.00 HYPERCHOLESTEROLEMIA: ICD-10-CM

## 2024-08-01 DIAGNOSIS — F41.9 ANXIETY: ICD-10-CM

## 2024-08-01 DIAGNOSIS — E03.9 ACQUIRED HYPOTHYROIDISM: ICD-10-CM

## 2024-08-01 DIAGNOSIS — E66.9 OBESITY (BMI 30.0-34.9): ICD-10-CM

## 2024-08-01 PROCEDURE — 99214 OFFICE O/P EST MOD 30 MIN: CPT | Performed by: INTERNAL MEDICINE

## 2024-08-01 NOTE — ASSESSMENT & PLAN NOTE
Patient is being followed by the psychiatrist currently taking Trileptal, mirtazapine and Xanax as needed there was no change in the medications in the recent past

## 2024-08-01 NOTE — ASSESSMENT & PLAN NOTE
Patient with hypothyroidism last TSH level was 2.28 continue with the same dose of Synthroid 150 mcg daily brand-name.

## 2024-08-06 NOTE — LETTER
[facility]  [address] [place] [address]  Dept: [phone] December 23, 2023    Patient: Anali Red  YOB: 1995    Anali Red was seen and evaluated at our [facility]. Please note if Covid and Flu tests are negative, they may return to work when fever free for 24 hours without the use of a fever reducing agent. If Covid or Flu test is positive, they may return to work on 12/26/23, as this is 6 days from the onset of symptoms. Upon return, they must then adhere to strict masking for an additional 4 days.    Sincerely,    MARYA Huffman  
Normal

## 2024-08-09 ENCOUNTER — TELEPHONE (OUTPATIENT)
Age: 29
End: 2024-08-09

## 2024-08-13 DIAGNOSIS — E66.9 OBESITY (BMI 30.0-34.9): ICD-10-CM

## 2024-08-14 NOTE — TELEPHONE ENCOUNTER
Refill must be reviewed and completed by the office or provider. The refill is unable to be approved or denied by the medication management team.      Wegovy - Please review to see if the refill is appropriate.   Patient comment: Hi pharmacy is staring that i meed the 1 MG. Insurance wont approve the .5mg.

## 2024-08-19 ENCOUNTER — TELEPHONE (OUTPATIENT)
Age: 29
End: 2024-08-19

## 2024-08-19 NOTE — TELEPHONE ENCOUNTER
Patient called in to follow up with provider on last order for   Semaglutide-Weight Management (WEGOVY) 0.5 MG/0.5ML. Pharmacy reports patient has been on this dose for several months and this order would require prior authorization or new order for 1 mg dose. Patient reports last injection was 8/8; missed dose 8/15. Please follow up with new order for 1 mg dose to [facility] pharmacy or submit PA for 0.5 mg dose. Please follow up with patient for resolution.

## 2024-08-20 DIAGNOSIS — E66.09 CLASS 1 OBESITY DUE TO EXCESS CALORIES WITHOUT SERIOUS COMORBIDITY WITH BODY MASS INDEX (BMI) OF 32.0 TO 32.9 IN ADULT: Primary | ICD-10-CM

## 2024-08-20 RX ORDER — SEMAGLUTIDE 1 MG/.5ML
INJECTION, SOLUTION SUBCUTANEOUS
Qty: 2 ML | Refills: 0 | Status: SHIPPED | OUTPATIENT
Start: 2024-08-20

## 2024-08-20 NOTE — TELEPHONE ENCOUNTER
Patient called to check in if PCP ordered Semaglutide-Weight Management (WEGOVY) 1mg. No orders for new dosage, please follow up with provider response or new orders if appropriate.

## 2024-08-22 ENCOUNTER — OFFICE VISIT (OUTPATIENT)
Dept: URGENT CARE | Age: 29
End: 2024-08-22
Payer: COMMERCIAL

## 2024-08-22 ENCOUNTER — NURSE TRIAGE (OUTPATIENT)
Age: 29
End: 2024-08-22

## 2024-08-22 VITALS
OXYGEN SATURATION: 98 % | TEMPERATURE: 98.9 F | RESPIRATION RATE: 18 BRPM | SYSTOLIC BLOOD PRESSURE: 126 MMHG | HEART RATE: 62 BPM | DIASTOLIC BLOOD PRESSURE: 66 MMHG

## 2024-08-22 DIAGNOSIS — R11.2 NAUSEA AND VOMITING, UNSPECIFIED VOMITING TYPE: ICD-10-CM

## 2024-08-22 DIAGNOSIS — J02.0 STREP THROAT: Primary | ICD-10-CM

## 2024-08-22 DIAGNOSIS — R59.9 SWELLING OF LYMPH NODES: ICD-10-CM

## 2024-08-22 LAB — S PYO AG THROAT QL: POSITIVE

## 2024-08-22 PROCEDURE — 87880 STREP A ASSAY W/OPTIC: CPT

## 2024-08-22 PROCEDURE — 99213 OFFICE O/P EST LOW 20 MIN: CPT | Performed by: EMERGENCY MEDICINE

## 2024-08-22 RX ORDER — METHYLPREDNISOLONE 4 MG
TABLET, DOSE PACK ORAL
Qty: 21 TABLET | Refills: 0 | Status: SHIPPED | OUTPATIENT
Start: 2024-08-22

## 2024-08-22 RX ORDER — ONDANSETRON 4 MG/1
4 TABLET, FILM COATED ORAL EVERY 8 HOURS PRN
Qty: 20 TABLET | Refills: 0 | Status: SHIPPED | OUTPATIENT
Start: 2024-08-22

## 2024-08-22 RX ORDER — ONDANSETRON 4 MG/1
4 TABLET, ORALLY DISINTEGRATING ORAL ONCE
Status: COMPLETED | OUTPATIENT
Start: 2024-08-22 | End: 2024-08-22

## 2024-08-22 RX ORDER — PENICILLIN V POTASSIUM 500 MG/1
500 TABLET, FILM COATED ORAL EVERY 12 HOURS
Qty: 20 TABLET | Refills: 0 | Status: SHIPPED | OUTPATIENT
Start: 2024-08-22 | End: 2024-09-01

## 2024-08-22 RX ADMIN — ONDANSETRON 4 MG: 4 TABLET, ORALLY DISINTEGRATING ORAL at 17:31

## 2024-08-22 NOTE — TELEPHONE ENCOUNTER
"Patient calls in today after noticing 2 lumps on the left side of her neck that are painful to touch.  Denies fever or drainage.  One is the size of a pea and the other a dime.  Does not think it is an insect bite.  Patient wanted to be seen.  Offered times that were available.  Patient not able to make appointment times offered.  RN advised that patient go to a  for further evaluation.  Patient unhappy and terminated call.        Reason for Disposition   Patient wants to be seen    Answer Assessment - Initial Assessment Questions  1. APPEARANCE of SWELLING: \"What does it look like?\" (e.g., lymph node, insect bite, mole)      \"Inflamed\"    2. SIZE: \"How large is the swelling?\" (inches, cm or compare to coins)      One like a dime and one is like a pea.   3. LOCATION: \"Where is the swelling located?\"      Left side of neck, lower than hairline area.  4. ONSET: \"When did the swelling start?\"      Noticed yesterday  5. PAIN: \"Is it painful?\" If Yes, ask: \"How much?\"      Painful to touch  6. ITCH: \"Does it itch?\" If Yes, ask: \"How much?\"      Denies  7. CAUSE: \"What do you think caused the swelling?\"      Unknown  8. OTHER SYMPTOMS: \"Do you have any other symptoms?\" (e.g., fever)      Denies    Protocols used: Skin Lump or Localized Swelling-ADULT-[place]    "

## 2024-08-22 NOTE — LETTER
August 22, 2024     Patient: Anali Red   YOB: 1995   Date of Visit: 8/22/2024       To Whom It May Concern:    It is my medical opinion that Anali Red may return to work on 8/26/24         Sincerely,        MARYA Huffman    CC: No Recipients

## 2024-08-22 NOTE — PROGRESS NOTES
[facility]        NAME: Anali Red is a 29 y.o. female  : 1995    MRN: [id number]  DATE: 2024  TIME: 5:28 PM      Assessment and Plan     Nausea and vomiting, unspecified vomiting type [R11.2]  1. Nausea and vomiting, unspecified vomiting type  POCT rapid ANTIGEN strepA    ondansetron (ZOFRAN) 4 mg tablet    ondansetron (ZOFRAN-ODT) dispersible tablet 4 mg      2. Swelling of lymph nodes  POCT rapid ANTIGEN strepA        Rapid strep positive- patient aware.     Patient Instructions     Take antibiotic as prescribed. Recommend eating yogurt with antibiotic use.  Use Zofran as prescribed.   Take steroids as directed. Recommend to take them in the morning and with food.   Recommend throat lozenges, anesthetic spray such as chloraseptic, and gargling warm salt water for throat irritation.   Recommended to switch out your toothbrush after 24 hours of antibiotic use and 5 days after starting antibiotics to prevent re-infection.    Acetaminophen or ibuprofen for fever and pain.   Follow-up with PCP in 3-5 days. Go to ER if symptoms worsen.     Chief Complaint     Chief Complaint   Patient presents with    Nausea     Pt c/o nausea, vomiting, HA and neck swelling. Cannot keep food or drink down. Started last night and getting worse today.          History of Present Illness     Patient is a 29-year-old female who presents with nausea, vomiting, headache and swelling to left side of neck that started 1 day ago.  Denies sore throat.  Denies ear pain.  Denies chance of pregnancy or diabetic history. Denies abdominal pain.     Nausea  Associated symptoms include headaches, nausea and vomiting. Pertinent negatives include no abdominal pain, coughing, fever or sore throat.       Review of Systems     Review of Systems   Constitutional:  Negative for fever.   HENT:  Negative for ear pain and sore throat.    Respiratory:  Negative for cough.    Gastrointestinal:  Positive for nausea and vomiting.  Negative for abdominal pain.   Neurological:  Positive for headaches.   Hematological:  Positive for adenopathy.   All other systems reviewed and are negative.        Current Medications       Current Outpatient Medications:     ALPRAZolam (XANAX) 0.25 mg tablet, Take 0.25 mg by mouth 2 (two) times a day as needed for anxiety, Disp: , Rfl:     cholecalciferol (VITAMIN D3) 1,000 units tablet, Take 1,000 Units by mouth 2 (two) times a day, Disp: , Rfl:     fluticasone (FLONASE) 50 mcg/act nasal spray, SPRAY 2 SPRAYS INTO EACH NOSTRIL EVERY DAY, Disp: 48 mL, Rfl: 1    Hallie 0.25-35 MG-MCG per tablet, Take 1 tablet by mouth daily, Disp: 84 tablet, Rfl: 4    mirtazapine (REMERON) 30 mg tablet, Take 1 tablet (30 mg total) by mouth daily at bedtime, Disp: , Rfl:     ondansetron (ZOFRAN) 4 mg tablet, Take 1 tablet (4 mg total) by mouth every 8 (eight) hours as needed for nausea or vomiting, Disp: 20 tablet, Rfl: 0    OXcarbazepine (TRILEPTAL) 300 mg tablet, Take 300 mg by mouth 2 (two) times a day, Disp: , Rfl:     Semaglutide-Weight Management (Wegovy) 1 MG/0.5ML, Inject 1 mg under the skin weekly, Disp: 2 mL, Rfl: 0    Synthroid 150 MCG tablet, Take 1 tablet (150 mcg total) by mouth daily in the early morning, Disp: 90 tablet, Rfl: 0    Current Facility-Administered Medications:     ondansetron (ZOFRAN-ODT) dispersible tablet 4 mg, 4 mg, Oral, Once,     Current Allergies     Allergies as of 08/22/2024    (No Known Allergies)              The following portions of the patient's history were reviewed and updated as appropriate: allergies, current medications, past family history, past medical history, past social history, past surgical history and problem list.     Past Medical History:   Diagnosis Date    HPV vaccine counseling     completed series    Hypothyroidism     Varicella vaccine        Past Surgical History:   Procedure Laterality Date    BREAST LUMPECTOMY Left     benign mass       Family History   Problem  Relation Age of Onset    Breast cancer Mother 57        BRCA negative    Hypertension Father     Liver cancer Brother 16        passed away age 18    Ovarian cancer Maternal Grandmother     No Known Problems Maternal Grandfather     No Known Problems Paternal Grandmother     No Known Problems Paternal Grandfather     Colon cancer Neg Hx          Medications have been verified.        Objective     /66   Pulse 62   Temp 98.9 °F (37.2 °C) (Tympanic)   Resp 18   LMP 07/08/2024 (Approximate)   SpO2 98%   Patient's last menstrual period was 07/08/2024 (approximate).         Physical Exam     Physical Exam  Vitals and nursing note reviewed.   Constitutional:       General: She is awake. She is not in acute distress.     Appearance: Normal appearance. She is not ill-appearing, toxic-appearing or diaphoretic.   HENT:      Right Ear: Ear canal and external ear normal. Tympanic membrane is not erythematous.      Left Ear: Ear canal and external ear normal. Tympanic membrane is not erythematous.      Nose: Nose normal.      Mouth/Throat:      Lips: Pink.      Mouth: Mucous membranes are moist.      Pharynx: Oropharynx is clear. Uvula midline. Posterior oropharyngeal erythema present. No pharyngeal swelling, oropharyngeal exudate, uvula swelling or postnasal drip.      Tonsils: No tonsillar exudate or tonsillar abscesses. 1+ on the right. 2+ on the left.   Cardiovascular:      Rate and Rhythm: Normal rate.      Pulses: Normal pulses.      Heart sounds: Normal heart sounds, S1 normal and S2 normal.   Pulmonary:      Effort: Pulmonary effort is normal.      Breath sounds: Normal breath sounds and air entry.   Lymphadenopathy:      Cervical: Cervical adenopathy present.      Right cervical: No posterior cervical adenopathy.     Left cervical: Posterior cervical adenopathy present.   Skin:     General: Skin is warm.      Capillary Refill: Capillary refill takes less than 2 seconds.   Neurological:      Mental Status: She  is alert.   Psychiatric:         Mood and Affect: Mood normal.         Behavior: Behavior normal.         Thought Content: Thought content normal.         Judgment: Judgment normal.

## 2024-08-22 NOTE — PATIENT INSTRUCTIONS
Take antibiotic as prescribed. Recommend eating yogurt with antibiotic use.  Use Zofran as prescribed.   Take steroids as directed. Recommend to take them in the morning and with food.   Recommend throat lozenges, anesthetic spray such as chloraseptic, and gargling warm salt water for throat irritation.   Recommended to switch out your toothbrush after 24 hours of antibiotic use and 5 days after starting antibiotics to prevent re-infection.    Acetaminophen or ibuprofen for fever and pain.   Follow-up with PCP in 3-5 days. Go to ER if symptoms worsen.

## 2024-08-22 NOTE — TELEPHONE ENCOUNTER
Regardin painful lumps on neck  ----- Message from Irma RANDLE sent at 2024 10:23 AM EDT -----  Patient called very concerned she noticed 2 hard lumps on the left side of her neck that are painful.  No avail appts that work for pt's scheduled until next Thursday, please triage as she is looking for guidance

## 2024-08-23 NOTE — TELEPHONE ENCOUNTER
Called the patient back on 8/22/24 and left message to call us as we had an appointment for 8/23/24 at 9:20.    Called patient again today, No answer left message:  Dr. Benoit wants her to stop the Wegovy as she reported the presence of lumps on her neck. Waiting for a return call from her   Detail Level: Zone Continue Regimen: Tretinoin 0.05% nightly

## 2024-09-12 NOTE — ASSESSMENT & PLAN NOTE
Patient is currently taking mirtazapine 30 mg twice a day Trileptal 150 mg daily and Xanax b i d  0 25 mg p r n  and is being followed by the psychiatrist will continue the same 
Patient with hypothyroidism currently on 150 mcg of brand name Synthroid with a TSH level of 0 01  Since patient is feeling better at this dosage he will continue the same for now and repeat the labs in about 6-8 weeks time and then decide whether to reduce the dose or not 
never

## 2024-09-25 ENCOUNTER — OFFICE VISIT (OUTPATIENT)
Dept: CARDIOLOGY CLINIC | Facility: CLINIC | Age: 29
End: 2024-09-25
Payer: COMMERCIAL

## 2024-09-25 VITALS
DIASTOLIC BLOOD PRESSURE: 80 MMHG | HEIGHT: 68 IN | SYSTOLIC BLOOD PRESSURE: 124 MMHG | BODY MASS INDEX: 32.23 KG/M2 | OXYGEN SATURATION: 98 % | HEART RATE: 74 BPM

## 2024-09-25 DIAGNOSIS — I49.8 SINUS ARRHYTHMIA: ICD-10-CM

## 2024-09-25 DIAGNOSIS — R93.1 ECHOCARDIOGRAM FINDINGS ABNORMAL, WITHOUT DIAGNOSIS: ICD-10-CM

## 2024-09-25 DIAGNOSIS — R07.9 CHEST PAIN OF UNCERTAIN ETIOLOGY: ICD-10-CM

## 2024-09-25 DIAGNOSIS — E78.00 HYPERCHOLESTEROLEMIA: Primary | ICD-10-CM

## 2024-09-25 PROCEDURE — 99244 OFF/OP CNSLTJ NEW/EST MOD 40: CPT | Performed by: INTERNAL MEDICINE

## 2024-09-25 NOTE — ASSESSMENT & PLAN NOTE
Ms. Anali Red has been experiencing on and off chest tightness.  Description of symptom is equal vocal for angina.  However there is no definite exertional component to the symptom and there no significant high risk features associated.  Risk factors include dyslipidemia.  Risk enhancers include history of hypothyroidism for a long time.  She also reports that she was previously told she had an abnormal echocardiogram but with benign abnormality in the past.  I do not have access to previous records.  ECG from the emergency room was significant for sinus arrhythmia.  Also reports occasional palpitations.    Due to concerning ongoing symptoms it will be reasonable to exclude cardiac etiology of symptoms.    -- I am requesting a exercise treadmill stress test to evaluate for ischemia as well as screen for any exercise related arrhythmias.  -- Will request echocardiogram to rule out any structural cardiac abnormality.  -- Advising her to continue normal activities and keep a diary of symptoms and bring the record for next visit.  -- I will plan 2-month follow-up visit however she is advised to reach out to me after the test results are available in the system and if workup is negative and she is feeling well right he can cancel the following appointment.  -- Regarding abnormal lipids I am advising nonpharmacologic measures at this time to improve her lipids.  Some tips are provided below.  -- Advise close follow-up with primary care physician regarding her hypothyroidism.    NON-PHARMACOLOGIC MEASURES THAT IMPROVE CHOLESTEROL       1. Reduce intake of saturated fats to less than 7% of total calories.  Some helpful tips to achieve this include:        -- use egg whites instead of whole eggs.        -- use lean turkey-crump instead of regular crump        -- use low-fat margarine on your toast  instead of butter.        -- use skim milk instead of whole milk.        -- use Soy-based products as such as Soy milk,  Tofu,    2. Reduced intake of dietary cholesterol to less than 200 mg per day.    3. Increase intake of plant sterols and  viscous soluble fiber.    -- Plant sterols and stanols (active daily doses, 400-3000 mg; expected LDL-C reduction of 8%-12%).          Plant sterols and stanols put in fat medium such as Margarine have been shown to lower LDL cholesterol ('Benecol', 'Smart Balance', Take Control' and similar products).      --   Soluble fibers including beta-glucan, psyllium, and glucomannan (active daily doses, 5-15 g; expected LDL-C reduction of 5%-15%)          Soluble viscous fibers such as  Oat ?-glucan (Oat Fiber) and Psyllium husk ( Metamucil etc.) lower cholesterol by reducing absorption of cholesterol bile acids and           delaying digestion of lipids.    4. Increase consumption of nuts, especially as a replacement for candy bars or other high simple-carbohydrate foods (Nuts, especially walnuts and almonds are rich in mono or polyunsaturated fats that lower LDL cholesterol.  They also reduced the glycemic load of the diet and may reduce insulin secretion and improve satiety.  There also rich in a my no acid arginine, which is the precursor of nitric oxide that promote vasodilation.)    5. Reduce weight through increasing physical activity/exercise and modifying diet.    6. Increase dietary intake of Omega 3 fatty acids ( found predominantly in fatty fish such as [place]/Tuna/Mackeral/Sardines/Herring as well as fish oil supplements).  Omega 3 fatty acids reduce plasma concentrations of triglycerides and decreased overall risk for coronary heart disease.    7. Limit alcohol intake and frequency to minimum.    8. Consider taking Nutraceutical supplements that may further lower blood cholesterol:  1. Berberine (active daily doses, 500-1500 mg; expected LDL-C reduction of 15%-20%);   2. Garlic (active daily doses, 5-6 g; expected LDL-C reduction of 5%-10%);  3. Green tea extracts (active daily doses,   g; expected LDL-C reduction of 5%)     HEALTH TIPS FOR PATIENTS WITH ABNORMAL CHOLESTEROL LEVELS INCLUDING ELEVATED TRIGLYCERIDES:    1. Sugar intake should be restricted. Most sugars, especially fructose, stimulatehepatic synthesis of free fatty acids ( FFAs) and thus FFA and triglycerides (TG) accumulation in the liver. Thisaccumulation drives VLDL production and increases plasma TG level in the mild to moderate range (s746-776 mg/dL). Fructose is prevalent in the Western diet,being about half the content of sucrose (white or brown sugar), of high-fructosecorn syrup, and also of honey, and being more than half the content of the sugarin fruit juice and certain other sweeteners, like agave. Thus, restriction of sugar-sweetened beverages, other added sugars, and fruit juice (but not fruit) is usuallyhelpful to reduce TG levels. Nonsugar carbohydrate (starch) has a weak TGlevel-raising effect, mainly if low in fiber, so restricting total carbohydrates mayslightly reduce TG levels.    2. Increase dietary fiber intake-- fibrous food lowers obstruction of bad cholesterol in the blood stream.  Fiber has a modest effect to blunt TG level increase with sugar and other carbohydrates.    3. Fat intake may need to be restricted. Dietary fat drives chylomicron production andrestricting dietary fat is the most effective way to reduce TG levels of more thanabout 800 mg/dL. Saturated fats are slightly more likely to lead to HTG than unsat-urated fats, but fatty fish has a paradoxic effect of lowering TG levels because ofthe several favorable effects of omega-3 oil on TG metabolism.    4. Physical activity should be increased. Increased exercise tends to reduce TGlevels, likely because of increased muscle TG oxidation as a fuel source, improvedglucose and insulin metabolism (including increased LPL activity/TG lipolysis), anddecreased hepatic TG and FFA content.    5. Weight loss should be encouraged. Whether by decreasing  total calories orincreasing activity, negative caloric balance reduces TG levels, likely related toimprovement in insulin resistance and decreased hepatic TG and FFA content.    6. Restriction of alcohol intake may be beneficial. Ethanol increases TG levels in manypatients at any level of intake, and so a trial of decreased consumption is warrantedfor plasma TG levels of more than about 200 mg/dL in patients who consume morethan about 2 servings per week. Tobacco and marijuana have minor TG level-raising effects that are generally not of clinical importance.    7. Home remedies to reduce triglycerides:  Fresh garlic (Eat 2-3 raw garlic close on empty stomach daily), apple cider vinegar,Cayenne pepper, Cinnamon, Coriander seeds, Henry mushroom, Nuts ( walnuts, almonds, bud nuts- Nuts are rich in polyunsaturated fatty acids, this component helps to reduce bad cholesterol).

## 2024-09-25 NOTE — PATIENT INSTRUCTIONS
CARDIOLOGY ASSESSMENT & PLAN:   Diagnosis ICD-10-CM Associated Orders   1. Hypercholesterolemia  E78.00       2. Chest pain of uncertain etiology  R07.9 Ambulatory referral to Cardiology      3. Echocardiogram findings abnormal, without diagnosis  R93.1       4. Sinus arrhythmia  I49.8         1. Hypercholesterolemia  2. Chest pain of uncertain etiology  Assessment & Plan:  Ms. Anali Red has been experiencing on and off chest tightness.  Description of symptom is equal vocal for angina.  However there is no definite exertional component to the symptom and there no significant high risk features associated.  Risk factors include dyslipidemia.  Risk enhancers include history of hypothyroidism for a long time.  She also reports that she was previously told she had an abnormal echocardiogram but with benign abnormality in the past.  I do not have access to previous records.  ECG from the emergency room was significant for sinus arrhythmia.  Also reports occasional palpitations.    Due to concerning ongoing symptoms it will be reasonable to exclude cardiac etiology of symptoms.    -- I am requesting a exercise treadmill stress test to evaluate for ischemia as well as screen for any exercise related arrhythmias.  -- Will request echocardiogram to rule out any structural cardiac abnormality.  -- Advising her to continue normal activities and keep a diary of symptoms and bring the record for next visit.  -- I will plan 2-month follow-up visit however she is advised to reach out to me after the test results are available in the system and if workup is negative and she is feeling well right he can cancel the following appointment.  -- Regarding abnormal lipids I am advising nonpharmacologic measures at this time to improve her lipids.  Some tips are provided below.  -- Advise close follow-up with primary care physician regarding her hypothyroidism.    NON-PHARMACOLOGIC MEASURES THAT IMPROVE CHOLESTEROL       1. Reduce  intake of saturated fats to less than 7% of total calories.  Some helpful tips to achieve this include:        -- use egg whites instead of whole eggs.        -- use lean turkey-crump instead of regular crump        -- use low-fat margarine on your toast  instead of butter.        -- use skim milk instead of whole milk.        -- use Soy-based products as such as Soy milk, Tofu,    2. Reduced intake of dietary cholesterol to less than 200 mg per day.    3. Increase intake of plant sterols and  viscous soluble fiber.    -- Plant sterols and stanols (active daily doses, 400-3000 mg; expected LDL-C reduction of 8%-12%).          Plant sterols and stanols put in fat medium such as Margarine have been shown to lower LDL cholesterol ('Benecol', 'Smart Balance', Take Control' and similar products).      --   Soluble fibers including beta-glucan, psyllium, and glucomannan (active daily doses, 5-15 g; expected LDL-C reduction of 5%-15%)          Soluble viscous fibers such as  Oat ?-glucan (Oat Fiber) and Psyllium husk ( Metamucil etc.) lower cholesterol by reducing absorption of cholesterol bile acids and           delaying digestion of lipids.    4. Increase consumption of nuts, especially as a replacement for candy bars or other high simple-carbohydrate foods (Nuts, especially walnuts and almonds are rich in mono or polyunsaturated fats that lower LDL cholesterol.  They also reduced the glycemic load of the diet and may reduce insulin secretion and improve satiety.  There also rich in a my no acid arginine, which is the precursor of nitric oxide that promote vasodilation.)    5. Reduce weight through increasing physical activity/exercise and modifying diet.    6. Increase dietary intake of Omega 3 fatty acids ( found predominantly in fatty fish such as [place]/Tuna/Mackeral/Sardines/Herring as well as fish oil supplements).  Omega 3 fatty acids reduce plasma concentrations of triglycerides and decreased overall risk for  coronary heart disease.    7. Limit alcohol intake and frequency to minimum.    8. Consider taking Nutraceutical supplements that may further lower blood cholesterol:  1. Berberine (active daily doses, 500-1500 mg; expected LDL-C reduction of 15%-20%);   2. Garlic (active daily doses, 5-6 g; expected LDL-C reduction of 5%-10%);  3. Green tea extracts (active daily doses,  g; expected LDL-C reduction of 5%)     HEALTH TIPS FOR PATIENTS WITH ABNORMAL CHOLESTEROL LEVELS INCLUDING ELEVATED TRIGLYCERIDES:    1. Sugar intake should be restricted. Most sugars, especially fructose, stimulatehepatic synthesis of free fatty acids ( FFAs) and thus FFA and triglycerides (TG) accumulation in the liver. Thisaccumulation drives VLDL production and increases plasma TG level in the mild to moderate range (y800-855 mg/dL). Fructose is prevalent in the Western diet,being about half the content of sucrose (white or brown sugar), of high-fructosecorn syrup, and also of honey, and being more than half the content of the sugarin fruit juice and certain other sweeteners, like agave. Thus, restriction of sugar-sweetened beverages, other added sugars, and fruit juice (but not fruit) is usuallyhelpful to reduce TG levels. Nonsugar carbohydrate (starch) has a weak TGlevel-raising effect, mainly if low in fiber, so restricting total carbohydrates mayslightly reduce TG levels.    2. Increase dietary fiber intake-- fibrous food lowers obstruction of bad cholesterol in the blood stream.  Fiber has a modest effect to blunt TG level increase with sugar and other carbohydrates.    3. Fat intake may need to be restricted. Dietary fat drives chylomicron production andrestricting dietary fat is the most effective way to reduce TG levels of more thanabout 800 mg/dL. Saturated fats are slightly more likely to lead to HTG than unsat-urated fats, but fatty fish has a paradoxic effect of lowering TG levels because ofthe several favorable effects of  omega-3 oil on TG metabolism.    4. Physical activity should be increased. Increased exercise tends to reduce TGlevels, likely because of increased muscle TG oxidation as a fuel source, improvedglucose and insulin metabolism (including increased LPL activity/TG lipolysis), anddecreased hepatic TG and FFA content.    5. Weight loss should be encouraged. Whether by decreasing total calories orincreasing activity, negative caloric balance reduces TG levels, likely related toimprovement in insulin resistance and decreased hepatic TG and FFA content.    6. Restriction of alcohol intake may be beneficial. Ethanol increases TG levels in manypatients at any level of intake, and so a trial of decreased consumption is warrantedfor plasma TG levels of more than about 200 mg/dL in patients who consume morethan about 2 servings per week. Tobacco and marijuana have minor TG level-raising effects that are generally not of clinical importance.    7. Home remedies to reduce triglycerides:  Fresh garlic (Eat 2-3 raw garlic close on empty stomach daily), apple cider vinegar,Cayenne pepper, Cinnamon, Coriander seeds, Henry mushroom, Nuts ( walnuts, almonds, bud nuts- Nuts are rich in polyunsaturated fatty acids, this component helps to reduce bad cholesterol).         Orders:  -     Ambulatory referral to Cardiology  3. Echocardiogram findings abnormal, without diagnosis  4. Sinus arrhythmia

## 2024-09-25 NOTE — PROGRESS NOTES
[facility]  [facility] [address]  Primary Office: [address] [place] [id number]  Phone: [phone]; Fax: [phone]  *-*-*-*-*-*-*-*-*-*-*-*-*-*-*-*-*-*-*-*-*-*-*-*-*-*-*-*-*-*-*-*-*-*-*-*-*-*-*-*-*-*-*-*-*-*-*-*-*-*-*-*-*-*  ENCOUNTER DATE: 09/25/24 2:36 PM  PATIENT NAME: Anali Red   1995    [id number]  Age: 29 y.o.      Sex: female  ENCOUNTER PROVIDER:  Tete Del Toro MD, [facility], [facility]  PRIMARYCARE PHYSICIAN: Rasta Benoit MD  REFERRING PHYSICIAN: Rasta Benoit MD  [address] [place] [address] Rasta Benoit MD   *-*-*-*-*-*-*-*-*-*-*-*-*-*-*-*-*-*-*-*-*-*-*-*-*-*-*-*-*-*-*-*-*-*-*-*-*-*-*-*-*-*-*-*-*-*-*-*-*-*-*-*-*-*-  REASON FOR REFERRAL: Evaluation of chest pain    *-*-*-*-*-*-*-*-*-*-*-*-*-*-*-*-*-*-*-*-*-*-*-*-*-*-*-*-*-*-*-*-*-*-*-*-*-*-*-*-*-*-*-*-*-*-*-*-*-*-*-*-*-*-  CARDIOLOGY ASSESSMENT & PLAN:   Diagnosis ICD-10-CM Associated Orders   1. Hypercholesterolemia  E78.00       2. Chest pain of uncertain etiology  R07.9 Ambulatory referral to Cardiology      3. Echocardiogram findings abnormal, without diagnosis  R93.1       4. Sinus arrhythmia  I49.8         1. Hypercholesterolemia  2. Chest pain of uncertain etiology  Assessment & Plan:  Ms. Anali Red has been experiencing on and off chest tightness.  Description of symptom is equal vocal for angina.  However there is no definite exertional component to the symptom and there no significant high risk features associated.  Risk factors include dyslipidemia.  Risk enhancers include history of hypothyroidism for a long time.  She also reports that she was previously told she had an abnormal echocardiogram but with benign abnormality in the past.  I do not have access to previous records.  ECG from the emergency room was significant for sinus arrhythmia.  Also reports occasional palpitations.    Due to concerning ongoing symptoms it will be reasonable to exclude cardiac etiology of  symptoms.    -- I am requesting a exercise treadmill stress test to evaluate for ischemia as well as screen for any exercise related arrhythmias.  -- Will request echocardiogram to rule out any structural cardiac abnormality.  -- Advising her to continue normal activities and keep a diary of symptoms and bring the record for next visit.  -- I will plan 2-month follow-up visit however she is advised to reach out to me after the test results are available in the system and if workup is negative and she is feeling well right he can cancel the following appointment.  -- Regarding abnormal lipids I am advising nonpharmacologic measures at this time to improve her lipids.  Some tips are provided below.  -- Advise close follow-up with primary care physician regarding her hypothyroidism.    NON-PHARMACOLOGIC MEASURES THAT IMPROVE CHOLESTEROL       1. Reduce intake of saturated fats to less than 7% of total calories.  Some helpful tips to achieve this include:        -- use egg whites instead of whole eggs.        -- use lean turkey-crump instead of regular crump        -- use low-fat margarine on your toast  instead of butter.        -- use skim milk instead of whole milk.        -- use Soy-based products as such as Soy milk, Tofu,    2. Reduced intake of dietary cholesterol to less than 200 mg per day.    3. Increase intake of plant sterols and  viscous soluble fiber.    -- Plant sterols and stanols (active daily doses, 400-3000 mg; expected LDL-C reduction of 8%-12%).          Plant sterols and stanols put in fat medium such as Margarine have been shown to lower LDL cholesterol ('Benecol', 'Smart Balance', Take Control' and similar products).      --   Soluble fibers including beta-glucan, psyllium, and glucomannan (active daily doses, 5-15 g; expected LDL-C reduction of 5%-15%)          Soluble viscous fibers such as  Oat ?-glucan (Oat Fiber) and Psyllium husk ( Metamucil etc.) lower cholesterol by reducing absorption of  cholesterol bile acids and           delaying digestion of lipids.    4. Increase consumption of nuts, especially as a replacement for candy bars or other high simple-carbohydrate foods (Nuts, especially walnuts and almonds are rich in mono or polyunsaturated fats that lower LDL cholesterol.  They also reduced the glycemic load of the diet and may reduce insulin secretion and improve satiety.  There also rich in a my no acid arginine, which is the precursor of nitric oxide that promote vasodilation.)    5. Reduce weight through increasing physical activity/exercise and modifying diet.    6. Increase dietary intake of Omega 3 fatty acids ( found predominantly in fatty fish such as [place]/Tuna/Mackeral/Sardines/Herring as well as fish oil supplements).  Omega 3 fatty acids reduce plasma concentrations of triglycerides and decreased overall risk for coronary heart disease.    7. Limit alcohol intake and frequency to minimum.    8. Consider taking Nutraceutical supplements that may further lower blood cholesterol:  1. Berberine (active daily doses, 500-1500 mg; expected LDL-C reduction of 15%-20%);   2. Garlic (active daily doses, 5-6 g; expected LDL-C reduction of 5%-10%);  3. Green tea extracts (active daily doses,  g; expected LDL-C reduction of 5%)     HEALTH TIPS FOR PATIENTS WITH ABNORMAL CHOLESTEROL LEVELS INCLUDING ELEVATED TRIGLYCERIDES:    1. Sugar intake should be restricted. Most sugars, especially fructose, stimulatehepatic synthesis of free fatty acids ( FFAs) and thus FFA and triglycerides (TG) accumulation in the liver. Thisaccumulation drives VLDL production and increases plasma TG level in the mild to moderate range (f285-382 mg/dL). Fructose is prevalent in the Western diet,being about half the content of sucrose (white or brown sugar), of high-fructosecorn syrup, and also of honey, and being more than half the content of the sugarin fruit juice and certain other sweeteners, like agave. Thus,  restriction of sugar-sweetened beverages, other added sugars, and fruit juice (but not fruit) is usuallyhelpful to reduce TG levels. Nonsugar carbohydrate (starch) has a weak TGlevel-raising effect, mainly if low in fiber, so restricting total carbohydrates mayslightly reduce TG levels.    2. Increase dietary fiber intake-- fibrous food lowers obstruction of bad cholesterol in the blood stream.  Fiber has a modest effect to blunt TG level increase with sugar and other carbohydrates.    3. Fat intake may need to be restricted. Dietary fat drives chylomicron production andrestricting dietary fat is the most effective way to reduce TG levels of more thanabout 800 mg/dL. Saturated fats are slightly more likely to lead to HTG than unsat-urated fats, but fatty fish has a paradoxic effect of lowering TG levels because ofthe several favorable effects of omega-3 oil on TG metabolism.    4. Physical activity should be increased. Increased exercise tends to reduce TGlevels, likely because of increased muscle TG oxidation as a fuel source, improvedglucose and insulin metabolism (including increased LPL activity/TG lipolysis), anddecreased hepatic TG and FFA content.    5. Weight loss should be encouraged. Whether by decreasing total calories orincreasing activity, negative caloric balance reduces TG levels, likely related toimprovement in insulin resistance and decreased hepatic TG and FFA content.    6. Restriction of alcohol intake may be beneficial. Ethanol increases TG levels in manypatients at any level of intake, and so a trial of decreased consumption is warrantedfor plasma TG levels of more than about 200 mg/dL in patients who consume morethan about 2 servings per week. Tobacco and marijuana have minor TG level-raising effects that are generally not of clinical importance.    7. Home remedies to reduce triglycerides:  Fresh garlic (Eat 2-3 raw garlic close on empty stomach daily), apple cider vinegar,Cayenne pepper,  Cinnamon, Coriander seeds, Henry mushroom, Nuts ( walnuts, almonds, bud nuts- Nuts are rich in polyunsaturated fatty acids, this component helps to reduce bad cholesterol).         Orders:  -     Ambulatory referral to Cardiology  3. Echocardiogram findings abnormal, without diagnosis  4. Sinus arrhythmia     *-*-*-*-*-*-*-*-*-*-*-*-*-*-*-*-*-*-*-*-*-*-*-*-*-*-*-*-*-*-*-*-*-*-*-*-*-*-*-*-*-*-*-*-*-*-*-*-*-*-*-*-*-*-  CURRENT ECG:  No results found for this visit on 09/25/24.    ECG during ER visit on 10/8/2024: Independently reviewed.  Normal sinus rhythm, normal axis and intervals, no significant chamber hypertrophy enlargement.  No evidence of prior infarction, no changes of ischemia.      *-*-*-*-*-*-*-*-*-*-*-*-*-*-*-*-*-*-*-*-*-*-*-*-*-*-*-*-*-*-*-*-*-*-*-*-*-*-*-*-*-*-*-*-*-*-*-*-*-*-*-*-*-*-  HISTORY OF PRESENT ILLNESS:  Ms. Anali Red is a 29-year young female with prior medical history significant for:     Hypothyroidism diagnosed in 2010  Seasonal allergies   Anxiety disorder  Class II obesity  History of lumpectomy for benign breast mass  Dyslipidemia.    She has been referred for evaluation of chest pain.  She mentions that her first episode was sometime in early July 2024.  She describes the symptom as relatively sudden onset tightness in her upper chest felt as somebody is sitting on the chest.  This symptom is occasionally associated with palpitation but no diaphoresis or shortness of breath or presyncope/syncope.  She initially did not pay attention to the symptom however when symptom intermittently persisted for 2 days she presented to emergency room on 7/5/2024.  In the emergency room her vital signs were stable.  Her blood work was overall unremarkable except for increased WBC count.  ECG and chest x-ray were overall normal.  She was discharged home with instructions to follow-up with primary care physician.  She has since followed with PCP and Ortho next couple of months she has on and off  experience this symptom.  Currently she is experiencing the symptom almost once a week.  Symptom of pain occurs randomly and is not precipitated with food or caffeine intake.  There is no relationship with her menstrual cycle.  She denies any associated shortness of breath but does get occasional palpitations with it.  She denies any passing out or near passing out episodes.  To this July she does not recall having similar symptoms.  She does however report that in 2010 when she was in high school she was experiencing some lightheadedness and did see a cardiologist.  She had an echocardiogram and was advised that there was some benign abnormality on the echo.  She was diagnosed with hyponatremia and was briefly treated with salt tablets.  She has not followed with cardiologist in the recent years.    She has no previous history of hypertension or diabetes or TIA/CVA or cardiac dysrhythmia.  She has 1 Synthroid therapy since 2010.    She denies any history of smoking.  She has about 2 cups of coffee a day.  She reports drinking alcohol occasionally maybe once a week.  She denies any marijuana use or any other illicit drug use.    She denies any family history of premature CAD or sudden cardiac death or chronic cardiac conditions.    She did have recurrent syncopal events when she was in school.  At that time she was playing sports.  She reports having extensive testing including tilt table testing.  She was placed on sodium pills and after that symptoms resolved.  She is no longer on any sodium supplementation.  She  currently goes to gym about 3 times a week.  She denies experiencing symptoms while at the gym.    She is not on any cardiac specific medications.  She was started on Wegovy for weight loss.  H&H with this medication was started about 3 months back she initially felt all right but following her second injection she noticed some symptoms of fatigue and hair loss and now she is wondering if the symptom of  chest pain was also related to this.  She is now considering not continuing this medication anymore.    Previous blood work:   Most recent blood work is from 7/5/2028 sodium was 138 potassium 3.8 chloride 104 bicarb 25 BUN 16 creatinine 0.73   Normal LFTs  Lipid profile from 6/20/2024 showed total cholesterol 203 triglyceride 120 HDL 54 calculated   CBC was significant for increased WBC otherwise was normal.  TSH was 2.28 on 2/6/2024  Hemoglobin A1c was 4.9 on 6/28/2024.    Previous cardiac studies: She recalls having echocardiogram and Holter test and tilt table in the remote past at Dr. Del Rio's office.  Report of this is not available for review.    *-*-*-*-*-*-*-*-*-*-*-*-*-*-*-*-*-*-*-*-*-*-*-*-*-*-*-*-*-*-*-*-*-*-*-*-*-*-*-*-*-*-*-*-*-*-*-*-*-*-*-*-*-*  PAST MEDICAL HISTORY:  Past Medical History:   Diagnosis Date    HPV vaccine counseling     completed series    Hypothyroidism     Varicella vaccine     PAST SURGICAL HISTORY:   Past Surgical History:   Procedure Laterality Date    BREAST LUMPECTOMY Left     benign mass         FAMILY HISTORY:  Family History   Problem Relation Age of Onset    Breast cancer Mother 57        BRCA negative    Hypertension Father     Liver cancer Brother 16        passed away age 18    Ovarian cancer Maternal Grandmother     No Known Problems Maternal Grandfather     No Known Problems Paternal Grandmother     No Known Problems Paternal Grandfather     Colon cancer Neg Hx     SOCIAL HISTORY:  Social History     Tobacco Use   Smoking Status Never   Smokeless Tobacco Never      Social History     Substance and Sexual Activity   Alcohol Use Not Currently    Alcohol/week: 0.0 - 3.0 standard drinks of alcohol    Comment: Occasional     Social History     Substance and Sexual Activity   Drug Use Never    [unfilled]     *-*-*-*-*-*-*-*-*-*-*-*-*-*-*-*-*-*-*-*-*-*-*-*-*-*-*-*-*-*-*-*-*-*-*-*-*-*-*-*-*-*-*-*-*-*-*-*-*-*-*-*-*-*  ALLERGIES:  No Known Allergies CURRENT SCHEDULED  MEDICATIONS:    Current Outpatient Medications:     ALPRAZolam (XANAX) 0.25 mg tablet, Take 0.25 mg by mouth 2 (two) times a day as needed for anxiety, Disp: , Rfl:     cholecalciferol (VITAMIN D3) 1,000 units tablet, Take 1,000 Units by mouth 2 (two) times a day, Disp: , Rfl:     fluticasone (FLONASE) 50 mcg/act nasal spray, SPRAY 2 SPRAYS INTO EACH NOSTRIL EVERY DAY, Disp: 48 mL, Rfl: 1    methylPREDNISolone 4 MG tablet therapy pack, Use as directed on package, Disp: 21 tablet, Rfl: 0    Hallie 0.25-35 MG-MCG per tablet, Take 1 tablet by mouth daily, Disp: 84 tablet, Rfl: 4    mirtazapine (REMERON) 30 mg tablet, Take 1 tablet (30 mg total) by mouth daily at bedtime, Disp: , Rfl:     ondansetron (ZOFRAN) 4 mg tablet, Take 1 tablet (4 mg total) by mouth every 8 (eight) hours as needed for nausea or vomiting, Disp: 20 tablet, Rfl: 0    OXcarbazepine (TRILEPTAL) 300 mg tablet, Take 300 mg by mouth 2 (two) times a day, Disp: , Rfl:     Semaglutide-Weight Management (Wegovy) 1 MG/0.5ML, Inject 1 mg under the skin weekly, Disp: 2 mL, Rfl: 0    Synthroid 150 MCG tablet, Take 1 tablet (150 mcg total) by mouth daily in the early morning, Disp: 90 tablet, Rfl: 0     *-*-*-*-*-*-*-*-*-*-*-*-*-*-*-*-*-*-*-*-*-*-*-*-*-*-*-*-*-*-*-*-*-*-*-*-*-*-*-*-*-*-*-*-*-*-*-*-*-*-*-*-*-*  REVIEW OF SYMPTOMS:    Positive for: As noted above in HPI  Negative for: All remaining as reviewed below and in HPI. SYSTEM SYMPTOMS REVIEWED:  General--weight change, fever, night sweats  Respiratoryl-- Wheezing, shortness of breath, cough, URI symptoms, sputum, blood  Cardiovascular--chest pain, syncope, dyspnea on exertion, edema, decline in exercise tolerance, claudication   Gastrointestinal--persistent vomiting, diarrhea, abdominal distention, blood in stool   Muscular or skeletal--joint pain or swelling   Neurologic--headaches, syncope, abnormal movement  Hematologic--history of easy bruising and bleeding   Endocrine--thyroid enlargement, heat or cold  "intolerance, polyuria   Psychiatric--anxiety, depression      *-*-*-*-*-*-*-*-*-*-*-*-*-*-*-*-*-*-*-*-*-*-*-*-*-*-*-*-*-*-*-*-*-*-*-*-*-*-*-*-*-*-*-*-*-*-*-*-*-*-*-*-*-*  CURRENT OUTPATIENT MEDICATIONS:     Current Outpatient Medications:     ALPRAZolam (XANAX) 0.25 mg tablet, Take 0.25 mg by mouth 2 (two) times a day as needed for anxiety, Disp: , Rfl:     cholecalciferol (VITAMIN D3) 1,000 units tablet, Take 1,000 Units by mouth 2 (two) times a day, Disp: , Rfl:     fluticasone (FLONASE) 50 mcg/act nasal spray, SPRAY 2 SPRAYS INTO EACH NOSTRIL EVERY DAY, Disp: 48 mL, Rfl: 1    methylPREDNISolone 4 MG tablet therapy pack, Use as directed on package, Disp: 21 tablet, Rfl: 0    Hallie 0.25-35 MG-MCG per tablet, Take 1 tablet by mouth daily, Disp: 84 tablet, Rfl: 4    mirtazapine (REMERON) 30 mg tablet, Take 1 tablet (30 mg total) by mouth daily at bedtime, Disp: , Rfl:     ondansetron (ZOFRAN) 4 mg tablet, Take 1 tablet (4 mg total) by mouth every 8 (eight) hours as needed for nausea or vomiting, Disp: 20 tablet, Rfl: 0    OXcarbazepine (TRILEPTAL) 300 mg tablet, Take 300 mg by mouth 2 (two) times a day, Disp: , Rfl:     Semaglutide-Weight Management (Wegovy) 1 MG/0.5ML, Inject 1 mg under the skin weekly, Disp: 2 mL, Rfl: 0    Synthroid 150 MCG tablet, Take 1 tablet (150 mcg total) by mouth daily in the early morning, Disp: 90 tablet, Rfl: 0    *-*-*-*-*-*-*-*-*-*-*-*-*-*-*-*-*-*-*-*-*-*-*-*-*-*-*-*-*-*-*-*-*-*-*-*-*-*-*-*-*-*-*-*-*-*-*-*-*-*-*-*-*-*  VITAL SIGNS:  Vitals:    09/25/24 1344   BP: 124/80   Pulse: 74   SpO2: 98%   Height: 5' 8\" (1.727 m)       BMI: Body mass index is 32.23 kg/m².    WEIGHTS:   Wt Readings from Last 25 Encounters:   08/01/24 96.2 kg (212 lb)   07/05/24 96.2 kg (212 lb)   05/09/24 96.2 kg (212 lb)   02/29/24 97.1 kg (214 lb)   02/08/24 95.7 kg (211 lb)   11/18/22 85.7 kg (189 lb)   10/14/19 85.8 kg (189 lb 3.2 oz)   07/24/15 77.1 kg (170 lb)   01/30/15 84.4 kg (186 lb)   07/23/14 81.2 kg " (179 lb)   09/18/13 74.4 kg (164 lb)   03/14/13 72.6 kg (160 lb) (87%, Z= 1.12)*   01/08/13 67.1 kg (148 lb) (78%, Z= 0.78)*   02/27/12 70.8 kg (156 lb) (86%, Z= 1.09)*   01/04/12 67.6 kg (149 lb) (82%, Z= 0.90)*   11/18/11 66.7 kg (147 lb) (80%, Z= 0.85)*   09/27/11 67.6 kg (149 lb) (82%, Z= 0.93)*   08/05/11 68.9 kg (152 lb) (85%, Z= 1.03)*   08/05/11 68.9 kg (152 lb) (85%, Z= 1.03)*   04/20/11 69.4 kg (153 lb) (86%, Z= 1.08)*   03/18/11 68 kg (150 lb) (84%, Z= 1.00)*   12/29/10 77.1 kg (170 lb) (93%, Z= 1.51)*   12/29/10 77.1 kg (170 lb) (93%, Z= 1.51)*   11/18/10 79.4 kg (175 lb) (95%, Z= 1.61)*   11/18/10 79.4 kg (175 lb) (95%, Z= 1.61)*     * Growth percentiles are based on CDC (Girls, 2-20 Years) data.        *-*-*-*-*-*-*-*-*-*-*-*-*-*-*-*-*-*-*-*-*-*-*-*-*-*-*-*-*-*-*-*-*-*-*-*-*-*-*-*-*-*-*-*-*-*-*-*-*-*-*-*-*-*-  PHYSICAL EXAM:  General Appearance:    Alert, cooperative, no distress, appears stated age   Head, Eyes, ENT:    No obvious abnormality, moist mucous mebranes.   Neck:   Supple, no carotid bruit. No JVD, no obvious lymphadenoapthy   Back:     Symmetric, no curvature.   Lungs:     Respirations unlabored. Clear to auscultation bilaterally,    Chest wall:    No tenderness or deformity   Heart:    Regular rate and rhythm, normal intensity heart sounds, no murmur, rub  or gallop.   Abdomen:     Soft, non-tender.   Extremities:   Extremities warm, no cyanosis or edema    Skin:   No venostatic changes in lower extremities.   Normal skin color, texture, and turgor. No rashes or lesions   Neuro: Pt is alert and oriented time 3 with no gross motor deficits.   Psychiatric/Behavioral: Mood is normal. Behavior is normal.   *-*-*-*-*-*-*-*-*-*-*-*-*-*-*-*-*-*-*-*-*-*-*-*-*-*-*-*-*-*-*-*-*-*-*-*-*-*-*-*-*-*-*-*-*-*-*-*-*-*-*-*-*-*-  LABORATORY DATA: I have personally reviewed the available laboratory data.        Potassium   Date Value Ref Range Status   07/05/2024 3.8 3.5 - 5.3 mmol/L Final   02/23/2024 4.4  3.5 - 5.2 mmol/L Final   12/30/2022 4.2 3.5 - 5.2 mmol/L Final   05/26/2022 3.6 3.5 - 5.2 mmol/L Final     Chloride   Date Value Ref Range Status   07/05/2024 104 96 - 108 mmol/L Final   02/23/2024 102 100 - 109 mmol/L Final   12/30/2022 107 100 - 109 mmol/L Final   05/26/2022 105 100 - 109 mmol/L Final     Carbon Dioxide   Date Value Ref Range Status   02/23/2024 26 21 - 31 mmol/L Final   12/30/2022 25 23 - 31 mmol/L Final   05/26/2022 23 23 - 31 mmol/L Final     CO2   Date Value Ref Range Status   07/05/2024 25 21 - 32 mmol/L Final     BUN   Date Value Ref Range Status   07/05/2024 16 5 - 25 mg/dL Final   02/23/2024 15 7 - 25 mg/dL Final   12/30/2022 9 7 - 25 mg/dL Final   05/26/2022 15 7 - 25 mg/dL Final     Creatinine   Date Value Ref Range Status   07/05/2024 0.73 0.60 - 1.30 mg/dL Final     Comment:     Standardized to IDMS reference method   02/23/2024 0.70 0.40 - 1.10 mg/dL Final   12/30/2022 0.70 0.40 - 1.10 mg/dL Final   05/26/2022 0.79 0.40 - 1.10 mg/dL Final     eGFRcr   Date Value Ref Range Status   02/23/2024 119 >59 Final   12/30/2022 120 >59 Final   05/26/2022 104 >59 Final     eGFR   Date Value Ref Range Status   07/05/2024 110 ml/min/1.73sq m Final     Calcium   Date Value Ref Range Status   07/05/2024 9.5 8.4 - 10.2 mg/dL Final   02/23/2024 9.4 8.5 - 10.1 mg/dL Final   12/30/2022 9.4 8.5 - 10.1 mg/dL Final   05/26/2022 8.9 8.5 - 10.1 mg/dL Final     AST   Date Value Ref Range Status   07/05/2024 16 13 - 39 U/L Final   02/23/2024 25 <41 U/L Final   12/30/2022 20 <41 U/L Final   01/28/2022 14 <41 U/L Final     ALT   Date Value Ref Range Status   07/05/2024 23 7 - 52 U/L Final     Comment:     Specimen collection should occur prior to Sulfasalazine administration due to the potential for falsely depressed results.    02/23/2024 35 <56 U/L Final   12/30/2022 33 <56 U/L Final   01/28/2022 20 <56 U/L Final     Alkaline Phosphatase   Date Value Ref Range Status   07/05/2024 64 34 - 104 U/L Final  "  02/23/2024 62 35 - 120 U/L Final   12/30/2022 93 35 - 120 U/L Final   01/28/2022 74 35 - 120 U/L Final     WBC   Date Value Ref Range Status   07/05/2024 10.20 (H) 4.31 - 10.16 Thousand/uL Final     Hemoglobin   Date Value Ref Range Status   07/05/2024 13.6 11.5 - 15.4 g/dL Final     Platelets   Date Value Ref Range Status   07/05/2024 306 149 - 390 Thousands/uL Final     No results found for: \"PT\", \"PTT\", \"INR\"  No results found for: \"CK\", \"CKMB\", \"DIGOXIN\"  TSH   Date Value Ref Range Status   02/06/2024 2.28 0.45 - 5.33 uIU/mL Final   12/30/2022 0.05 (L) 0.36 - 3.74 uIU/mL Final     Cholesterol   Date Value Ref Range Status   07/24/2015 193 (H) 100 - 189 mg/dL Final     HDL   Date Value Ref Range Status   07/24/2015 59 >39 mg/dL Final     Comment:     Result Comment: According to ATP-III Guidelines, HDL-C >59 mg/dL is considered a  negative risk factor for CHD.       HDL, Direct   Date Value Ref Range Status   06/28/2024 54 >=50 mg/dL Final     Triglycerides   Date Value Ref Range Status   06/28/2024 120 See Comment mg/dL Final     Comment:     Triglyceride:     0-9Y            <75mg/dL     10Y-17Y         <90 mg/dL       >=18Y     Normal          <150 mg/dL     Borderline High 150-199 mg/dL     High            200-499 mg/dL        Very High       >499 mg/dL    Specimen collection should occur prior to Metamizole administration due to the potential for falsely depressed results.   07/24/2015 91 0 - 114 mg/dL Final      Hemoglobin A1C   Date Value Ref Range Status   06/28/2024 4.9 Normal 4.0-5.6%; PreDiabetic 5.7-6.4%; Diabetic >=6.5%; Glycemic control for adults with diabetes <7.0% % Final   02/23/2024 4.9 <5.7 % Final     Comment:     Reference Range  Non-diabetic                     <5.7  Pre-diabetic                     5.7-6.4  Diabetic                         >=6.5  ADA target for diabetic control  <=7     No results found for: \"BLOODCX\", \"SPUTUMCULTUR\", \"GRAMSTAIN\", \"URINECX\", \"WOUNDCULT\", \"BODYFLUIDCUL\", " "\"MRSACULTURE\", \"INFLUAPCR\", \"INFLUBPCR\", \"RSVPCR\", \"LEGIONELLAUR\", \"CDIFFTOXINB\"    *-*-*-*-*-*-*-*-*-*-*-*-*-*-*-*-*-*-*-*-*-*-*-*-*-*-*-*-*-*-*-*-*-*-*-*-*-*-*-*-*-*-*-*-*-*-*-*-*-*-*-*-*-*-  RADIOLOGY RESULTS:  XR chest portable    Result Date: 7/5/2024  Impression: No acute cardiopulmonary disease. Workstation performed: [id number]       *-*-*-*-*-*-*-*-*-*-*-*-*-*-*-*-*-*-*-*-*-*-*-*-*-*-*-*-*-*-*-*-*-*-*-*-*-*-*-*-*-*-*-*-*-*-*-*-*-*-*-*-*-*-  LAST ECHOCARDIOGRAM AND OTHER CARDIOLOGY RESULTS:  No results found for this or any previous visit.    No results found for this or any previous visit.    No results found for this or any previous visit.    No results found for this or any previous visit.       *-*-*-*-*-*-*-*-*-*-*-*-*-*-*-*-*-*-*-*-*-*-*-*-*-*-*-*-*-*-*-*-*-*-*-*-*-*-*-*-*-*-*-*-*-*-*-*-*-*-*-*-*-*-  SIGNATURES:   @SIG@   Tete Del Toro MD     CC:   MD Cy Corral Raghuveer, MD     "

## 2024-10-07 NOTE — PROGRESS NOTES
Adult Annual Physical  Name: Anali Red      : 1995      MRN: [id number]  Encounter Provider: Rasta Benoit MD  Encounter Date: 10/8/2024   Encounter department: [facility]    Assessment & Plan  Acquired hypothyroidism  Patient's last TSH level was normal 2.28 currently she is taking brand-name Synthroid 150 mcg daily however she has noticed recently increased hair loss which she has noticed in the past when there is a TSH level abnormality will order the labs adjust the dose based on the findings.    Orders:    TSH, 3rd generation with Free T4 reflex; Future    Anxiety  Currently patient is taking Remeron, Trileptal and also Xanax as needed being followed by the psychiatrist.         Chest pain of uncertain etiology  Follow-up with the cardiologist I reviewed his report patient to have a stress test and an echocardiogram done         Class 1 obesity due to excess calories without serious comorbidity with body mass index (BMI) of 32.0 to 32.9 in adult      Orders:    Semaglutide-Weight Management (Wegovy) 1 MG/0.5ML; Inject 1 mg under the skin weekly    Nausea and vomiting, unspecified vomiting type    Orders:    ondansetron (ZOFRAN) 4 mg tablet; Take 1 tablet (4 mg total) by mouth every 8 (eight) hours as needed for nausea or vomiting    Hypercholesterolemia  Last lipid profile shows cholesterol 198 LDL at 125 emphasized regarding diet exercise follow-up with lipid profile    Orders:    Lipid panel; Future    Screening for cervical cancer         Hypothyroidism, unspecified type    Orders:    Synthroid 150 MCG tablet; Take 1 tablet (150 mcg total) by mouth daily in the early morning    Flu vaccine need    Orders:    influenza vaccine preservative-free 0.5 mL IM (Fluzone, Afluria, Fluarix, Flulaval)    BMI 32.0-32.9,adult  Currently patient is taking Wegovy 1 mg weekly.  However patient is noting nauseous feeling the day after she takes the injection.  There was also a concern with  sore throat when she developed she received steroids with that that caused some hair loss and we also had a concern about the thyroid.  Will get an ultrasound of the thyroid gland         Echocardiogram findings abnormal, without diagnosis         Thyroid nodule    Orders:    US thyroid; Future    Annual physical exam         Immunizations and preventive care screenings were discussed with patient today. Appropriate education was printed on patient's after visit summary.    Counseling:  Alcohol/drug use: discussed moderation in alcohol intake, the recommendations for healthy alcohol use, and avoidance of illicit drug use.  Dental Health: discussed importance of regular tooth brushing, flossing, and dental visits.  Injury prevention: discussed safety/seat belts, safety helmets, smoke detectors, carbon monoxide detectors, and smoking near bedding or upholstery.  Sexual health: discussed sexually transmitted diseases, partner selection, use of condoms, avoidance of unintended pregnancy, and contraceptive alternatives.  Exercise: the importance of regular exercise/physical activity was discussed. Recommend exercise 3-5 times per week for at least 30 minutes.          History of Present Illness patient is coming here for a follow-up evaluation with regards to atypical chest pain, hypothyroidism, obesity.  Patient also complains of feeling nauseous especially after taking the injection of Wegovy.    She was seen by the cardiologist with regards to atypical chest pain I reviewed his report scheduling her for treadmill stress test, echocardiogram and he also recommending good diet for the lowering the cholesterol.    Patient has noticed loss of hair especially a month ago usually she notices this when her thyroid function is not right.  Last TSH level was 6 months back.    Adult Annual Physical:  Patient presents for annual physical.     Diet and Physical Activity:  - Diet/Nutrition: low calorie diet and consuming 3-5  servings of fruits/vegetables daily.  - Exercise: walking, moderate cardiovascular exercise, 3-4 times a week on average and 30-60 minutes on average.    General Health:  - Sleep: sleeps well and 7-8 hours of sleep on average.  - Hearing: normal hearing bilateral ears.  - Vision: previous LASIK surgery.  - Dental: regular dental visits and brushes teeth twice daily. pt flosses    /GYN Health:  - Follows with GYN: yes.   - Last menstrual cycle: 9/24/2024.   - History of STDs: no  - Contraception: oral contraceptives.      Advanced Care Planning:  - Has an advanced directive?: no    - Has a durable medical POA?: no    - ACP document given to patient?: yes      Review of Systems   Constitutional:  Negative for chills and fever.   HENT:  Negative for ear pain and sore throat.    Eyes:  Negative for pain and visual disturbance.   Respiratory:  Negative for cough and shortness of breath.    Cardiovascular:  Negative for chest pain and palpitations.   Gastrointestinal:  Negative for abdominal pain and vomiting.   Genitourinary:  Negative for dysuria and hematuria.   Musculoskeletal:  Negative for arthralgias and back pain.   Skin:  Negative for color change and rash.   Neurological:  Negative for seizures and syncope.   All other systems reviewed and are negative.    Pertinent Medical History   As mentioned in the history of present illness patient with atypical chest pain, hypothyroidism, obesity      Medical History Reviewed by provider this encounter:       Past Medical History   Past Medical History:   Diagnosis Date    HPV vaccine counseling     completed series    Hypothyroidism     Varicella vaccine      Past Surgical History:   Procedure Laterality Date    BREAST LUMPECTOMY Left     benign mass     Family History   Problem Relation Age of Onset    Breast cancer Mother 57        BRCA negative    Hypertension Father     Liver cancer Brother 16        passed away age 18    Ovarian cancer Maternal Grandmother     No  Known Problems Maternal Grandfather     No Known Problems Paternal Grandmother     No Known Problems Paternal Grandfather     Colon cancer Neg Hx      Current Outpatient Medications on File Prior to Visit   Medication Sig Dispense Refill    ALPRAZolam (XANAX) 0.25 mg tablet Take 0.25 mg by mouth 2 (two) times a day as needed for anxiety      cholecalciferol (VITAMIN D3) 1,000 units tablet Take 1,000 Units by mouth 2 (two) times a day      fluticasone (FLONASE) 50 mcg/act nasal spray SPRAY 2 SPRAYS INTO EACH NOSTRIL EVERY DAY 48 mL 1    methylPREDNISolone 4 MG tablet therapy pack Use as directed on package 21 tablet 0    Hallie 0.25-35 MG-MCG per tablet Take 1 tablet by mouth daily 84 tablet 4    mirtazapine (REMERON) 30 mg tablet Take 1 tablet (30 mg total) by mouth daily at bedtime      OXcarbazepine (TRILEPTAL) 300 mg tablet Take 300 mg by mouth 2 (two) times a day      [DISCONTINUED] ondansetron (ZOFRAN) 4 mg tablet Take 1 tablet (4 mg total) by mouth every 8 (eight) hours as needed for nausea or vomiting 20 tablet 0    [DISCONTINUED] Semaglutide-Weight Management (Wegovy) 1 MG/0.5ML Inject 1 mg under the skin weekly 2 mL 0    [DISCONTINUED] Synthroid 150 MCG tablet Take 1 tablet (150 mcg total) by mouth daily in the early morning 90 tablet 0     No current facility-administered medications on file prior to visit.   No Known Allergies   Current Outpatient Medications on File Prior to Visit   Medication Sig Dispense Refill    ALPRAZolam (XANAX) 0.25 mg tablet Take 0.25 mg by mouth 2 (two) times a day as needed for anxiety      cholecalciferol (VITAMIN D3) 1,000 units tablet Take 1,000 Units by mouth 2 (two) times a day      fluticasone (FLONASE) 50 mcg/act nasal spray SPRAY 2 SPRAYS INTO EACH NOSTRIL EVERY DAY 48 mL 1    methylPREDNISolone 4 MG tablet therapy pack Use as directed on package 21 tablet 0    Hallie 0.25-35 MG-MCG per tablet Take 1 tablet by mouth daily 84 tablet 4    mirtazapine (REMERON) 30 mg tablet  "Take 1 tablet (30 mg total) by mouth daily at bedtime      OXcarbazepine (TRILEPTAL) 300 mg tablet Take 300 mg by mouth 2 (two) times a day      [DISCONTINUED] ondansetron (ZOFRAN) 4 mg tablet Take 1 tablet (4 mg total) by mouth every 8 (eight) hours as needed for nausea or vomiting 20 tablet 0    [DISCONTINUED] Semaglutide-Weight Management (Wegovy) 1 MG/0.5ML Inject 1 mg under the skin weekly 2 mL 0    [DISCONTINUED] Synthroid 150 MCG tablet Take 1 tablet (150 mcg total) by mouth daily in the early morning 90 tablet 0     No current facility-administered medications on file prior to visit.      Social History     Tobacco Use    Smoking status: Never    Smokeless tobacco: Never   Vaping Use    Vaping status: Never Used   Substance and Sexual Activity    Alcohol use: Not Currently     Alcohol/week: 0.0 - 3.0 standard drinks of alcohol     Comment: Occasional    Drug use: Never    Sexual activity: Yes     Partners: Male     Birth control/protection: OCP, Condom Male     Comment: lifetime partners: 14; current partner: 4 months       Objective     /69 (BP Location: Right arm, Patient Position: Sitting, Cuff Size: Standard)   Pulse 73   Ht 5' 8\" (1.727 m)   SpO2 97%   BMI 32.23 kg/m²     Physical Exam  Vitals and nursing note reviewed.   Constitutional:       General: She is not in acute distress.     Appearance: She is well-developed.   HENT:      Head: Normocephalic and atraumatic.   Eyes:      Conjunctiva/sclera: Conjunctivae normal.   Cardiovascular:      Rate and Rhythm: Normal rate and regular rhythm.      Heart sounds: No murmur heard.  Pulmonary:      Effort: Pulmonary effort is normal. No respiratory distress.      Breath sounds: Normal breath sounds.   Abdominal:      Palpations: Abdomen is soft.      Tenderness: There is no abdominal tenderness.   Musculoskeletal:         General: No swelling.      Cervical back: Neck supple.   Skin:     General: Skin is warm and dry.      Capillary Refill: " Capillary refill takes less than 2 seconds.   Neurological:      Mental Status: She is alert.   Psychiatric:         Mood and Affect: Mood normal.       Recent Results (from the past 1344 hour(s))   POCT rapid ANTIGEN strepA    Collection Time: 08/22/24  5:37 PM   Result Value Ref Range     RAPID STREP A Positive (A) Negative   TSH, 3rd generation with Free T4 reflex    Collection Time: 10/09/24  9:38 AM   Result Value Ref Range    TSH 3RD GENERATON 0.830 0.450 - 4.500 uIU/mL   Lipid panel    Collection Time: 10/09/24  9:38 AM   Result Value Ref Range    Cholesterol 194 See Comment mg/dL    Triglycerides 128 See Comment mg/dL    HDL, Direct 53 >=50 mg/dL    LDL Calculated 115 (H) 0 - 100 mg/dL    Non-HDL-Chol (CHOL-HDL) 141 mg/dl

## 2024-10-08 ENCOUNTER — OFFICE VISIT (OUTPATIENT)
Dept: FAMILY MEDICINE CLINIC | Facility: CLINIC | Age: 29
End: 2024-10-08
Payer: COMMERCIAL

## 2024-10-08 VITALS
HEIGHT: 68 IN | BODY MASS INDEX: 32.23 KG/M2 | SYSTOLIC BLOOD PRESSURE: 122 MMHG | OXYGEN SATURATION: 97 % | HEART RATE: 73 BPM | DIASTOLIC BLOOD PRESSURE: 69 MMHG

## 2024-10-08 DIAGNOSIS — E03.9 ACQUIRED HYPOTHYROIDISM: Primary | ICD-10-CM

## 2024-10-08 DIAGNOSIS — R93.1 ECHOCARDIOGRAM FINDINGS ABNORMAL, WITHOUT DIAGNOSIS: ICD-10-CM

## 2024-10-08 DIAGNOSIS — Z23 FLU VACCINE NEED: ICD-10-CM

## 2024-10-08 DIAGNOSIS — Z12.4 SCREENING FOR CERVICAL CANCER: ICD-10-CM

## 2024-10-08 DIAGNOSIS — F41.9 ANXIETY: ICD-10-CM

## 2024-10-08 DIAGNOSIS — R11.2 NAUSEA AND VOMITING, UNSPECIFIED VOMITING TYPE: ICD-10-CM

## 2024-10-08 DIAGNOSIS — E04.1 THYROID NODULE: ICD-10-CM

## 2024-10-08 DIAGNOSIS — E03.9 HYPOTHYROIDISM, UNSPECIFIED TYPE: ICD-10-CM

## 2024-10-08 DIAGNOSIS — E66.811 CLASS 1 OBESITY DUE TO EXCESS CALORIES WITHOUT SERIOUS COMORBIDITY WITH BODY MASS INDEX (BMI) OF 32.0 TO 32.9 IN ADULT: ICD-10-CM

## 2024-10-08 DIAGNOSIS — Z00.00 ANNUAL PHYSICAL EXAM: ICD-10-CM

## 2024-10-08 DIAGNOSIS — E66.09 CLASS 1 OBESITY DUE TO EXCESS CALORIES WITHOUT SERIOUS COMORBIDITY WITH BODY MASS INDEX (BMI) OF 32.0 TO 32.9 IN ADULT: ICD-10-CM

## 2024-10-08 DIAGNOSIS — E78.00 HYPERCHOLESTEROLEMIA: ICD-10-CM

## 2024-10-08 DIAGNOSIS — R07.9 CHEST PAIN OF UNCERTAIN ETIOLOGY: ICD-10-CM

## 2024-10-08 PROCEDURE — 99395 PREV VISIT EST AGE 18-39: CPT | Performed by: INTERNAL MEDICINE

## 2024-10-08 PROCEDURE — 90656 IIV3 VACC NO PRSV 0.5 ML IM: CPT

## 2024-10-08 PROCEDURE — 99213 OFFICE O/P EST LOW 20 MIN: CPT | Performed by: INTERNAL MEDICINE

## 2024-10-08 PROCEDURE — 90471 IMMUNIZATION ADMIN: CPT

## 2024-10-08 RX ORDER — SEMAGLUTIDE 1 MG/.5ML
INJECTION, SOLUTION SUBCUTANEOUS
Qty: 2 ML | Refills: 0 | Status: SHIPPED | OUTPATIENT
Start: 2024-10-08

## 2024-10-08 RX ORDER — ONDANSETRON 4 MG/1
4 TABLET, FILM COATED ORAL EVERY 8 HOURS PRN
Qty: 20 TABLET | Refills: 0 | Status: SHIPPED | OUTPATIENT
Start: 2024-10-08

## 2024-10-08 RX ORDER — LEVOTHYROXINE SODIUM 150 MCG
150 TABLET ORAL
Qty: 90 TABLET | Refills: 0 | Status: SHIPPED | OUTPATIENT
Start: 2024-10-08

## 2024-10-08 NOTE — ASSESSMENT & PLAN NOTE
Last lipid profile shows cholesterol 198 LDL at 125 emphasized regarding diet exercise follow-up with lipid profile    Orders:    Lipid panel; Future

## 2024-10-08 NOTE — ASSESSMENT & PLAN NOTE
Follow-up with the cardiologist I reviewed his report patient to have a stress test and an echocardiogram done

## 2024-10-08 NOTE — ASSESSMENT & PLAN NOTE
Patient's last TSH level was normal 2.28 currently she is taking brand-name Synthroid 150 mcg daily however she has noticed recently increased hair loss which she has noticed in the past when there is a TSH level abnormality will order the labs adjust the dose based on the findings.    Orders:    TSH, 3rd generation with Free T4 reflex; Future

## 2024-10-08 NOTE — ASSESSMENT & PLAN NOTE
Currently patient is taking Remeron, Trileptal and also Xanax as needed being followed by the psychiatrist.

## 2024-10-08 NOTE — ASSESSMENT & PLAN NOTE
Currently patient is taking Wegovy 1 mg weekly.  However patient is noting nauseous feeling the day after she takes the injection.  There was also a concern with sore throat when she developed she received steroids with that that caused some hair loss and we also had a concern about the thyroid.  Will get an ultrasound of the thyroid gland

## 2024-10-09 ENCOUNTER — APPOINTMENT (OUTPATIENT)
Dept: LAB | Facility: HOSPITAL | Age: 29
End: 2024-10-09
Payer: COMMERCIAL

## 2024-10-09 DIAGNOSIS — E78.00 HYPERCHOLESTEROLEMIA: ICD-10-CM

## 2024-10-09 DIAGNOSIS — E03.9 ACQUIRED HYPOTHYROIDISM: ICD-10-CM

## 2024-10-09 LAB
CHOLEST SERPL-MCNC: 194 MG/DL
HDLC SERPL-MCNC: 53 MG/DL
LDLC SERPL CALC-MCNC: 115 MG/DL (ref 0–100)
NONHDLC SERPL-MCNC: 141 MG/DL
TRIGL SERPL-MCNC: 128 MG/DL
TSH SERPL DL<=0.05 MIU/L-ACNC: 0.83 UIU/ML (ref 0.45–4.5)

## 2024-10-09 PROCEDURE — 80061 LIPID PANEL: CPT

## 2024-10-09 PROCEDURE — 36415 COLL VENOUS BLD VENIPUNCTURE: CPT

## 2024-10-09 PROCEDURE — 84443 ASSAY THYROID STIM HORMONE: CPT

## 2024-10-10 ENCOUNTER — HOSPITAL ENCOUNTER (OUTPATIENT)
Dept: ULTRASOUND IMAGING | Facility: HOSPITAL | Age: 29
Discharge: HOME | End: 2024-10-10
Payer: COMMERCIAL

## 2024-10-10 DIAGNOSIS — E04.1 THYROID NODULE: ICD-10-CM

## 2024-10-10 PROCEDURE — 76536 US EXAM OF HEAD AND NECK: CPT

## 2024-10-27 DIAGNOSIS — Z30.41 ORAL CONTRACEPTIVE PILL SURVEILLANCE: ICD-10-CM

## 2024-10-28 ENCOUNTER — TELEPHONE (OUTPATIENT)
Age: 29
End: 2024-10-28

## 2024-10-28 RX ORDER — NORGESTIMATE AND ETHINYL ESTRADIOL 0.25-0.035
1 KIT ORAL DAILY
Qty: 84 TABLET | Refills: 1 | Status: SHIPPED | OUTPATIENT
Start: 2024-10-28 | End: 2024-10-29 | Stop reason: SDUPTHER

## 2024-10-28 NOTE — TELEPHONE ENCOUNTER
Caller: Anali Red    Doctor: Dr Del Toro    Reason for call: Per patient, she was to have echocardiogram performed, but order not available in chart.     Call back#: [phone]

## 2024-10-29 ENCOUNTER — TELEPHONE (OUTPATIENT)
Age: 29
End: 2024-10-29

## 2024-10-29 DIAGNOSIS — Z30.41 ORAL CONTRACEPTIVE PILL SURVEILLANCE: ICD-10-CM

## 2024-10-29 RX ORDER — NORGESTIMATE AND ETHINYL ESTRADIOL 0.25-0.035
1 KIT ORAL DAILY
Qty: 84 TABLET | Refills: 2 | Status: SHIPPED | OUTPATIENT
Start: 2024-10-29

## 2024-10-29 NOTE — TELEPHONE ENCOUNTER
Please advise the patient that I had renewed her prescription from [facility] and her previous physician had marked it brand name only. I have sent a new prescription with enough refills until her next annual and have removed the no substitutions.  She should call the pharmacy to see if they have the generic available.  She should also use a back up method of contraception for the month as she missed some pills.

## 2024-10-29 NOTE — TELEPHONE ENCOUNTER
Patient states she tried picking up Hallie tablets and states there is a shortage and unable to pick them up anywhere. States she was informed by pharmacy script was sent in as no substitution. Patient seeking alternate medication request. Patient has no pills left and was supposed to have started yesterday.    Routing to provider as high priority for review.

## 2024-10-30 NOTE — TELEPHONE ENCOUNTER
Patient returned call, informed of response and recommendations. Verbalized understanding. No further questions.

## 2024-11-12 DIAGNOSIS — E66.09 CLASS 1 OBESITY DUE TO EXCESS CALORIES WITHOUT SERIOUS COMORBIDITY WITH BODY MASS INDEX (BMI) OF 32.0 TO 32.9 IN ADULT: ICD-10-CM

## 2024-11-12 DIAGNOSIS — E66.811 CLASS 1 OBESITY DUE TO EXCESS CALORIES WITHOUT SERIOUS COMORBIDITY WITH BODY MASS INDEX (BMI) OF 32.0 TO 32.9 IN ADULT: ICD-10-CM

## 2024-11-13 RX ORDER — SEMAGLUTIDE 1 MG/.5ML
INJECTION, SOLUTION SUBCUTANEOUS
Qty: 2 ML | Refills: 0 | Status: SHIPPED | OUTPATIENT
Start: 2024-11-13 | End: 2024-11-18 | Stop reason: SDUPTHER

## 2024-11-18 DIAGNOSIS — E66.811 CLASS 1 OBESITY DUE TO EXCESS CALORIES WITHOUT SERIOUS COMORBIDITY WITH BODY MASS INDEX (BMI) OF 32.0 TO 32.9 IN ADULT: ICD-10-CM

## 2024-11-18 DIAGNOSIS — E66.09 CLASS 1 OBESITY DUE TO EXCESS CALORIES WITHOUT SERIOUS COMORBIDITY WITH BODY MASS INDEX (BMI) OF 32.0 TO 32.9 IN ADULT: ICD-10-CM

## 2024-11-19 RX ORDER — SEMAGLUTIDE 1 MG/.5ML
INJECTION, SOLUTION SUBCUTANEOUS
Qty: 2 ML | Refills: 0 | Status: SHIPPED | OUTPATIENT
Start: 2024-11-19

## 2024-11-20 ENCOUNTER — TELEPHONE (OUTPATIENT)
Dept: INTERNAL MEDICINE CLINIC | Facility: CLINIC | Age: 29
End: 2024-11-20

## 2024-11-21 NOTE — TELEPHONE ENCOUNTER
PA Wegovy 1 MG/0.5ML SUBMITTED     to [employer]     via    []CMM-KEY:    [x]Surescripts-Case ID # [id number]   []Availity-Auth ID #  NDC #     []Faxed to plan   []Other website    []Phone call Case ID #      []PA sent as URGENT    All office notes, labs and other pertaining documents and studies sent. Clinical questions answered. Awaiting determination from insurance company.     Turnaround time for your insurance to make a decision on your Prior Authorization can take 7-21 business days.

## 2024-12-06 NOTE — PROGRESS NOTES
Office Visit Note  12/15/24     Anali Red 29 y.o. female MRN: [id number]  : 1995    Assessment:     1. Tongue abnormality  Assessment & Plan:  Patient noted to have small bumps on the back of the tongue however exam is unremarkable except for small bumps noted no focal findings patient concerned about STDs we will order the lab work for the same.  2. Acquired hypothyroidism  Assessment & Plan:  Last TSH level was normal at 0.830 normal currently patient is taking brand-name Synthroid 150 mcg daily we will continue with the same  3. Anxiety  Assessment & Plan:  Patient is very anxious being followed by the psychiatrist currently on Remeron Trileptal and Xanax as needed continue  4. Screening for cervical cancer  -     Ambulatory referral to Obstetrics / Gynecology; Future  5. Hypercholesterolemia  Assessment & Plan:  Lipid profile shows cholesterol at 194 triglycerides 128 HDL 53  this was done on  emphasized regarding diet exercise lifestyle modification will follow-up with repeat lipid profile in few months  6. Acute non-recurrent maxillary sinusitis  7. Chest pain of uncertain etiology  8. Sinus arrhythmia  9. Need for hepatitis C screening test  -     Hepatitis C antibody; Future  10. Screening for deficiency anemia  -     CBC and differential; Future  11. Prediabetes  -     Comprehensive metabolic panel; Future  12. Syphilis  -     RPR-Syphilis Screening (Total Syphilis IGG/IGM); Future  13. Encounter for screening for HIV  -     HIV 1/2 AG/AB w Reflex [facility] for 2 yr old and above; Future  14. BMI 32.0-32.9,adult  Assessment & Plan:  Patient is currently taking Wegovy 1 mg weekly her BMI is 32.23 weight is 212 will increase the dose to 1.7 weekly  Orders:  -     Semaglutide-Weight Management (Wegovy) 1.7 MG/0.75ML; Inject 1.7 mg under the skin weekly  15. B12 deficiency  -     Vitamin B12; Future  16. Chlamydia  -     Chlamydia trachomatis antibody, IgM; Future             Discussion  Summary and Plan:  Today's care plan and medications were reviewed with patient in detail and all their questions answered to their satisfaction.    No chief complaint on file.     Subjective:  Patient is coming for evaluation regarding small bumps she has noticed on the back of the tongue wanted to have STD testing also done.  Denies any symptoms of difficulty with swallowing no fever or any other associated symptoms no nausea vomiting.  No loss of taste or any other associated symptoms no burning sensation of the tongue.    Patient with hypothyroidism.  Medications reviewed labs reviewed.    Patient with BMI of 32.23 was on Wegovy 1 mg we will increase it to 1.7 weekly        The following portions of the patient's history were reviewed and updated as appropriate: allergies, current medications, past family history, past medical history, past social history, past surgical history and problem list.    Review of Systems   Constitutional:  Negative for chills and fever.   HENT:  Negative for ear pain and sore throat.    Eyes:  Negative for pain and visual disturbance.   Respiratory:  Negative for cough and shortness of breath.    Cardiovascular:  Negative for chest pain and palpitations.   Gastrointestinal:  Negative for abdominal pain and vomiting.   Genitourinary:  Negative for dysuria and hematuria.   Musculoskeletal:  Negative for arthralgias and back pain.   Skin:  Negative for color change and rash.   Neurological:  Negative for seizures and syncope.   Psychiatric/Behavioral:  The patient is nervous/anxious.    All other systems reviewed and are negative.        Historical Information   Patient Active Problem List   Diagnosis    Acquired hypothyroidism    Anxiety    Hypercholesterolemia    BMI 32.0-32.9,adult    Family history of breast cancer in mother    Chest pain of uncertain etiology    Echocardiogram findings abnormal, without diagnosis    Sinus arrhythmia    Tongue abnormality     Past Medical History:    Diagnosis Date    Depression     HPV vaccine counseling     completed series    Hypothyroidism     Varicella vaccine      Past Surgical History:   Procedure Laterality Date    BREAST BIOPSY  2017    BREAST LUMPECTOMY Left     benign mass     Social History     Substance and Sexual Activity   Alcohol Use Not Currently    Alcohol/week: 0.0 - 3.0 standard drinks of alcohol    Comment: Occasional     Social History     Substance and Sexual Activity   Drug Use Never     Social History     Tobacco Use   Smoking Status Never   Smokeless Tobacco Never     Family History   Problem Relation Age of Onset    Breast cancer Mother 57        BRCA negative    Hypertension Father     Liver cancer Brother 16        passed away age 18    Ovarian cancer Maternal Grandmother     No Known Problems Maternal Grandfather     No Known Problems Paternal Grandmother     No Known Problems Paternal Grandfather     Colon cancer Neg Hx      Health Maintenance Due   Topic    Cervical Cancer Screening     COVID-19 Vaccine (5 - 2024-25 season)    Depression Screening       Meds/Allergies       Current Outpatient Medications:     ALPRAZolam (XANAX) 0.25 mg tablet, Take 0.25 mg by mouth 2 (two) times a day as needed for anxiety, Disp: , Rfl:     cholecalciferol (VITAMIN D3) 1,000 units tablet, Take 1,000 Units by mouth 2 (two) times a day, Disp: , Rfl:     fluticasone (FLONASE) 50 mcg/act nasal spray, SPRAY 2 SPRAYS INTO EACH NOSTRIL EVERY DAY, Disp: 48 mL, Rfl: 1    mirtazapine (REMERON) 30 mg tablet, Take 1 tablet (30 mg total) by mouth daily at bedtime, Disp: , Rfl:     norgestimate-ethinyl estradiol (Hallie) 0.25-35 MG-MCG per tablet, Take 1 tablet by mouth daily, Disp: 84 tablet, Rfl: 2    ondansetron (ZOFRAN) 4 mg tablet, Take 1 tablet (4 mg total) by mouth every 8 (eight) hours as needed for nausea or vomiting, Disp: 20 tablet, Rfl: 0    OXcarbazepine (TRILEPTAL) 300 mg tablet, Take 300 mg by mouth 2 (two) times a day, Disp: , Rfl:      "Semaglutide-Weight Management (Wegovy) 1.7 MG/0.75ML, Inject 1.7 mg under the skin weekly, Disp: 3 mL, Rfl: 0    Synthroid 150 MCG tablet, Take 1 tablet (150 mcg total) by mouth daily in the early morning, Disp: 90 tablet, Rfl: 0      Objective:    Vitals:   /76 (BP Location: Right arm, Patient Position: Sitting, Cuff Size: Standard)   Pulse 71   Ht 5' 8\" (1.727 m)   Wt 96.2 kg (212 lb)   SpO2 97%   BMI 32.23 kg/m²   Body mass index is 32.23 kg/m².  Vitals:    12/09/24 1713   Weight: 96.2 kg (212 lb)       Physical Exam  Vitals and nursing note reviewed.   Constitutional:       Appearance: Normal appearance.   HENT:      Mouth/Throat:      Comments: Oral cavity examination of the back of the tongue small areas of induration noted but otherwise unremarkable with no focal findings.  Cardiovascular:      Rate and Rhythm: Normal rate and regular rhythm.      Heart sounds: Normal heart sounds.   Pulmonary:      Effort: Pulmonary effort is normal.      Breath sounds: Normal breath sounds.   Abdominal:      Palpations: Abdomen is soft.   Musculoskeletal:         General: Normal range of motion.      Cervical back: Normal range of motion and neck supple.      Right lower leg: No edema.      Left lower leg: No edema.   Skin:     General: Skin is warm and dry.   Neurological:      General: No focal deficit present.      Mental Status: She is alert.   Psychiatric:      Comments: Nervous and anxious         Lab Review   No visits with results within 2 Month(s) from this visit.   Latest known visit with results is:   Appointment on 10/09/2024   Component Date Value Ref Range Status    TSH 3RD GENERATON 10/09/2024 0.830  0.450 - 4.500 uIU/mL Final    The recommended reference ranges for TSH during pregnancy are as follows:   First trimester 0.100 to 2.500 uIU/mL   Second trimester  0.200 to 3.000 uIU/mL   Third trimester 0.300 to 3.000 uIU/m    Note: Normal ranges may not apply to patients who are transgender, " "non-binary, or whose legal sex, sex at birth, and gender identity differ.  Adult TSH (3rd generation) reference range follows the recommended guidelines of the American Thyroid Association, January, 2020.    Cholesterol 10/09/2024 194  See Comment mg/dL Final    Cholesterol:         Pediatric <18 Years        Desirable          <170 mg/dL      Borderline High    170-199 mg/dL      High               >=200 mg/dL        Adult >=18 Years            Desirable         <200 mg/dL      Borderline High   200-239 mg/dL      High              >239 mg/dL      Triglycerides 10/09/2024 128  See Comment mg/dL Final    Triglyceride:     0-9Y            <75mg/dL     10Y-17Y         <90 mg/dL       >=18Y     Normal          <150 mg/dL     Borderline High 150-199 mg/dL     High            200-499 mg/dL        Very High       >499 mg/dL    Specimen collection should occur prior to Metamizole administration due to the potential for falsely depressed results.    HDL, Direct 10/09/2024 53  >=50 mg/dL Final    LDL Calculated 10/09/2024 115 (H)  0 - 100 mg/dL Final    LDL Cholesterol:     Optimal           <100 mg/dl     Near Optimal      100-129 mg/dl     Above Optimal       Borderline High 130-159 mg/dl       High            160-189 mg/dl       Very High       >189 mg/dl         This screening LDL is a calculated result.   It does not have the accuracy of the Direct Measured LDL in the monitoring of patients with hyperlipidemia and/or statin therapy.   Direct Measure LDL (MJT502) must be ordered separately in these patients.    Non-HDL-Chol (CHOL-HDL) 10/09/2024 141  mg/dl Final         Rasta Benoit MD        \"This note has been constructed using a voice recognition system.Therefore there may be syntax, spelling, and/or grammatical errors. Please call if you have any questions. \"  "

## 2024-12-09 ENCOUNTER — OFFICE VISIT (OUTPATIENT)
Dept: FAMILY MEDICINE CLINIC | Facility: CLINIC | Age: 29
End: 2024-12-09
Payer: COMMERCIAL

## 2024-12-09 ENCOUNTER — TELEPHONE (OUTPATIENT)
Age: 29
End: 2024-12-09

## 2024-12-09 VITALS
HEART RATE: 71 BPM | HEIGHT: 68 IN | DIASTOLIC BLOOD PRESSURE: 76 MMHG | OXYGEN SATURATION: 97 % | WEIGHT: 212 LBS | SYSTOLIC BLOOD PRESSURE: 124 MMHG | BODY MASS INDEX: 32.13 KG/M2

## 2024-12-09 DIAGNOSIS — E78.00 HYPERCHOLESTEROLEMIA: ICD-10-CM

## 2024-12-09 DIAGNOSIS — E03.9 ACQUIRED HYPOTHYROIDISM: ICD-10-CM

## 2024-12-09 DIAGNOSIS — J01.00 ACUTE NON-RECURRENT MAXILLARY SINUSITIS: ICD-10-CM

## 2024-12-09 DIAGNOSIS — E53.8 B12 DEFICIENCY: ICD-10-CM

## 2024-12-09 DIAGNOSIS — F41.9 ANXIETY: ICD-10-CM

## 2024-12-09 DIAGNOSIS — R07.9 CHEST PAIN OF UNCERTAIN ETIOLOGY: ICD-10-CM

## 2024-12-09 DIAGNOSIS — A53.9 SYPHILIS: ICD-10-CM

## 2024-12-09 DIAGNOSIS — I49.8 SINUS ARRHYTHMIA: ICD-10-CM

## 2024-12-09 DIAGNOSIS — Z13.0 SCREENING FOR DEFICIENCY ANEMIA: ICD-10-CM

## 2024-12-09 DIAGNOSIS — R73.03 PREDIABETES: ICD-10-CM

## 2024-12-09 DIAGNOSIS — A74.9 CHLAMYDIA: ICD-10-CM

## 2024-12-09 DIAGNOSIS — Z11.59 NEED FOR HEPATITIS C SCREENING TEST: ICD-10-CM

## 2024-12-09 DIAGNOSIS — Z11.4 ENCOUNTER FOR SCREENING FOR HIV: ICD-10-CM

## 2024-12-09 DIAGNOSIS — Z12.4 SCREENING FOR CERVICAL CANCER: ICD-10-CM

## 2024-12-09 DIAGNOSIS — Q38.3 TONGUE ABNORMALITY: Primary | ICD-10-CM

## 2024-12-09 PROCEDURE — 99214 OFFICE O/P EST MOD 30 MIN: CPT | Performed by: INTERNAL MEDICINE

## 2024-12-09 RX ORDER — SEMAGLUTIDE 1.7 MG/.75ML
INJECTION, SOLUTION SUBCUTANEOUS
Qty: 3 ML | Refills: 0 | Status: SHIPPED | OUTPATIENT
Start: 2024-12-09

## 2024-12-10 ENCOUNTER — APPOINTMENT (OUTPATIENT)
Dept: LAB | Facility: HOSPITAL | Age: 29
End: 2024-12-10
Payer: COMMERCIAL

## 2024-12-10 DIAGNOSIS — Z11.4 ENCOUNTER FOR SCREENING FOR HIV: ICD-10-CM

## 2024-12-10 DIAGNOSIS — Z13.0 SCREENING FOR DEFICIENCY ANEMIA: ICD-10-CM

## 2024-12-10 DIAGNOSIS — A74.9 CHLAMYDIA: ICD-10-CM

## 2024-12-10 DIAGNOSIS — Z11.59 NEED FOR HEPATITIS C SCREENING TEST: ICD-10-CM

## 2024-12-10 DIAGNOSIS — E53.8 B12 DEFICIENCY: ICD-10-CM

## 2024-12-10 DIAGNOSIS — A53.9 SYPHILIS: ICD-10-CM

## 2024-12-10 LAB
BASOPHILS # BLD AUTO: 0.06 THOUSANDS/ÂΜL (ref 0–0.1)
BASOPHILS NFR BLD AUTO: 1 % (ref 0–1)
EOSINOPHIL # BLD AUTO: 0.23 THOUSAND/ÂΜL (ref 0–0.61)
EOSINOPHIL NFR BLD AUTO: 3 % (ref 0–6)
ERYTHROCYTE [DISTWIDTH] IN BLOOD BY AUTOMATED COUNT: 12.9 % (ref 11.6–15.1)
HCT VFR BLD AUTO: 38.4 % (ref 34.8–46.1)
HCV AB SER QL: NORMAL
HGB BLD-MCNC: 13 G/DL (ref 11.5–15.4)
HIV 1+2 AB+HIV1 P24 AG SERPL QL IA: NORMAL
HIV 2 AB SERPL QL IA: NORMAL
HIV1 AB SERPL QL IA: NORMAL
HIV1 P24 AG SERPL QL IA: NORMAL
IMM GRANULOCYTES # BLD AUTO: 0.02 THOUSAND/UL (ref 0–0.2)
IMM GRANULOCYTES NFR BLD AUTO: 0 % (ref 0–2)
LYMPHOCYTES # BLD AUTO: 2.66 THOUSANDS/ÂΜL (ref 0.6–4.47)
LYMPHOCYTES NFR BLD AUTO: 30 % (ref 14–44)
MCH RBC QN AUTO: 30.9 PG (ref 26.8–34.3)
MCHC RBC AUTO-ENTMCNC: 33.9 G/DL (ref 31.4–37.4)
MCV RBC AUTO: 91 FL (ref 82–98)
MONOCYTES # BLD AUTO: 0.63 THOUSAND/ÂΜL (ref 0.17–1.22)
MONOCYTES NFR BLD AUTO: 7 % (ref 4–12)
NEUTROPHILS # BLD AUTO: 5.24 THOUSANDS/ÂΜL (ref 1.85–7.62)
NEUTS SEG NFR BLD AUTO: 59 % (ref 43–75)
NRBC BLD AUTO-RTO: 0 /100 WBCS
PLATELET # BLD AUTO: 285 THOUSANDS/UL (ref 149–390)
PMV BLD AUTO: 9.3 FL (ref 8.9–12.7)
RBC # BLD AUTO: 4.21 MILLION/UL (ref 3.81–5.12)
TREPONEMA PALLIDUM IGG+IGM AB [PRESENCE] IN SERUM OR PLASMA BY IMMUNOASSAY: NORMAL
VIT B12 SERPL-MCNC: 274 PG/ML (ref 180–914)
WBC # BLD AUTO: 8.84 THOUSAND/UL (ref 4.31–10.16)

## 2024-12-10 PROCEDURE — 86632 CHLAMYDIA IGM ANTIBODY: CPT

## 2024-12-10 PROCEDURE — 36415 COLL VENOUS BLD VENIPUNCTURE: CPT

## 2024-12-10 PROCEDURE — 82607 VITAMIN B-12: CPT

## 2024-12-10 PROCEDURE — 87389 HIV-1 AG W/HIV-1&-2 AB AG IA: CPT

## 2024-12-10 PROCEDURE — 86780 TREPONEMA PALLIDUM: CPT

## 2024-12-10 PROCEDURE — 86803 HEPATITIS C AB TEST: CPT

## 2024-12-10 PROCEDURE — 85025 COMPLETE CBC W/AUTO DIFF WBC: CPT

## 2024-12-10 NOTE — TELEPHONE ENCOUNTER
The [employer] Prior Authorization Team is unable to review this request for prior authorization as the medication has been previously approved. This approval will  3/28/2025. No further action is needed at this time.

## 2024-12-11 ENCOUNTER — RESULTS FOLLOW-UP (OUTPATIENT)
Dept: FAMILY MEDICINE CLINIC | Facility: CLINIC | Age: 29
End: 2024-12-11

## 2024-12-13 ENCOUNTER — OFFICE VISIT (OUTPATIENT)
Dept: OBGYN CLINIC | Facility: CLINIC | Age: 29
End: 2024-12-13
Payer: COMMERCIAL

## 2024-12-13 VITALS
BODY MASS INDEX: 32.23 KG/M2 | DIASTOLIC BLOOD PRESSURE: 76 MMHG | HEIGHT: 68 IN | SYSTOLIC BLOOD PRESSURE: 124 MMHG | OXYGEN SATURATION: 98 % | HEART RATE: 70 BPM

## 2024-12-13 DIAGNOSIS — Z11.3 ROUTINE SCREENING FOR STI (SEXUALLY TRANSMITTED INFECTION): Primary | ICD-10-CM

## 2024-12-13 LAB — C TRACH IGM TITR SER IF: <0.8 INDEX (ref 0–0.7)

## 2024-12-13 PROCEDURE — 87510 GARDNER VAG DNA DIR PROBE: CPT | Performed by: OBSTETRICS & GYNECOLOGY

## 2024-12-13 PROCEDURE — 87480 CANDIDA DNA DIR PROBE: CPT | Performed by: OBSTETRICS & GYNECOLOGY

## 2024-12-13 PROCEDURE — 99213 OFFICE O/P EST LOW 20 MIN: CPT | Performed by: OBSTETRICS & GYNECOLOGY

## 2024-12-13 PROCEDURE — 87591 N.GONORRHOEAE DNA AMP PROB: CPT | Performed by: OBSTETRICS & GYNECOLOGY

## 2024-12-13 PROCEDURE — 87491 CHLMYD TRACH DNA AMP PROBE: CPT | Performed by: OBSTETRICS & GYNECOLOGY

## 2024-12-13 PROCEDURE — 87660 TRICHOMONAS VAGIN DIR PROBE: CPT | Performed by: OBSTETRICS & GYNECOLOGY

## 2024-12-13 NOTE — PROGRESS NOTES
"Subjective    Patient is a 30 yo G0 who presents today for STI testing after unprotected intercourse over the weekend. She took Plan B within 24 hours of the encounter. She reports a small amount of milky vaginal discharge, and some bumps on her labia, which are not irritating or bothersome to her. Earlier this week, she had negative testing for HIV, syphilis, and Hepatitis C. She has no other concerns.    OB History          0    Para   0    Term   0       0    AB   0    Living   0         SAB   0    IAB   0    Ectopic   0    Multiple   0    Live Births   0           Obstetric Comments   Menarche: 11    Menses: 28/3-4/regular tampon every 8 hours                      Objective    Vitals:    24 0736   BP: 124/76   BP Location: Right arm   Patient Position: Sitting   Cuff Size: Large   Pulse: 70   SpO2: 98%   Height: 5' 8\" (1.727 m)        Physical Exam  Constitutional:       Appearance: She is well-developed.   Genitourinary:      Vulva normal.      No vaginal discharge.   Cardiovascular:      Rate and Rhythm: Normal rate.   Pulmonary:      Effort: Pulmonary effort is normal. No respiratory distress.   Skin:     General: Skin is warm and dry.          Assessment    Patient Active Problem List   Diagnosis    Acquired hypothyroidism    Anxiety    Hypercholesterolemia    BMI 32.0-32.9,adult    Family history of breast cancer in mother    Chest pain of uncertain etiology    Echocardiogram findings abnormal, without diagnosis    Sinus arrhythmia        Plan   - No evidence of molluscum or HSV on exam; labial bumps appear to be normal skin texture  - GCCT and Affirm collected  - HSV antibodies also ordered  "

## 2024-12-14 LAB
C TRACH DNA SPEC QL NAA+PROBE: NEGATIVE
CANDIDA RRNA VAG QL PROBE: NOT DETECTED
G VAGINALIS RRNA GENITAL QL PROBE: DETECTED
N GONORRHOEA DNA SPEC QL NAA+PROBE: NEGATIVE
T VAGINALIS RRNA GENITAL QL PROBE: NOT DETECTED

## 2024-12-15 PROBLEM — Q38.3 TONGUE ABNORMALITY: Status: ACTIVE | Noted: 2024-12-15

## 2024-12-15 NOTE — ASSESSMENT & PLAN NOTE
Lipid profile shows cholesterol at 194 triglycerides 128 HDL 53  this was done on October 9 emphasized regarding diet exercise lifestyle modification will follow-up with repeat lipid profile in few months

## 2024-12-15 NOTE — ASSESSMENT & PLAN NOTE
Last TSH level was normal at 0.830 normal currently patient is taking brand-name Synthroid 150 mcg daily we will continue with the same

## 2024-12-15 NOTE — ASSESSMENT & PLAN NOTE
Patient is very anxious being followed by the psychiatrist currently on Remeron Trileptal and Xanax as needed continue

## 2024-12-15 NOTE — ASSESSMENT & PLAN NOTE
Patient noted to have small bumps on the back of the tongue however exam is unremarkable except for small bumps noted no focal findings patient concerned about STDs we will order the lab work for the same.

## 2024-12-15 NOTE — ASSESSMENT & PLAN NOTE
Patient is currently taking Wegovy 1 mg weekly her BMI is 32.23 weight is 212 will increase the dose to 1.7 weekly

## 2024-12-16 ENCOUNTER — RESULTS FOLLOW-UP (OUTPATIENT)
Dept: LABOR AND DELIVERY | Facility: HOSPITAL | Age: 29
End: 2024-12-16

## 2024-12-16 DIAGNOSIS — B96.89 BV (BACTERIAL VAGINOSIS): Primary | ICD-10-CM

## 2024-12-16 DIAGNOSIS — N76.0 BV (BACTERIAL VAGINOSIS): Primary | ICD-10-CM

## 2024-12-16 RX ORDER — METRONIDAZOLE 500 MG/1
500 TABLET ORAL EVERY 12 HOURS SCHEDULED
Qty: 14 TABLET | Refills: 0 | Status: SHIPPED | OUTPATIENT
Start: 2024-12-16 | End: 2024-12-23

## 2025-01-07 ENCOUNTER — TELEMEDICINE (OUTPATIENT)
Dept: OTHER | Facility: HOSPITAL | Age: 30
End: 2025-01-07
Payer: COMMERCIAL

## 2025-01-07 ENCOUNTER — RESULTS FOLLOW-UP (OUTPATIENT)
Dept: OTHER | Facility: HOSPITAL | Age: 30
End: 2025-01-07

## 2025-01-07 ENCOUNTER — APPOINTMENT (OUTPATIENT)
Dept: LAB | Facility: HOSPITAL | Age: 30
End: 2025-01-07
Payer: COMMERCIAL

## 2025-01-07 DIAGNOSIS — N39.0 URINARY TRACT INFECTION WITHOUT HEMATURIA, SITE UNSPECIFIED: Primary | ICD-10-CM

## 2025-01-07 DIAGNOSIS — N39.0 URINARY TRACT INFECTION WITHOUT HEMATURIA, SITE UNSPECIFIED: ICD-10-CM

## 2025-01-07 LAB
BACTERIA UR QL AUTO: ABNORMAL /HPF
BILIRUB UR QL STRIP: NEGATIVE
CLARITY UR: CLEAR
COLOR UR: ABNORMAL
GLUCOSE UR STRIP-MCNC: NEGATIVE MG/DL
HGB UR QL STRIP.AUTO: ABNORMAL
KETONES UR STRIP-MCNC: NEGATIVE MG/DL
LEUKOCYTE ESTERASE UR QL STRIP: ABNORMAL
MUCOUS THREADS UR QL AUTO: ABNORMAL
NITRITE UR QL STRIP: NEGATIVE
NON-SQ EPI CELLS URNS QL MICRO: ABNORMAL /HPF
PH UR STRIP.AUTO: 6 [PH]
PROT UR STRIP-MCNC: NEGATIVE MG/DL
RBC #/AREA URNS AUTO: ABNORMAL /HPF
SP GR UR STRIP.AUTO: 1.01 (ref 1–1.03)
UROBILINOGEN UR STRIP-ACNC: <2 MG/DL
WBC #/AREA URNS AUTO: ABNORMAL /HPF

## 2025-01-07 PROCEDURE — 99213 OFFICE O/P EST LOW 20 MIN: CPT | Performed by: NURSE PRACTITIONER

## 2025-01-07 PROCEDURE — 81001 URINALYSIS AUTO W/SCOPE: CPT

## 2025-01-07 RX ORDER — NITROFURANTOIN 25; 75 MG/1; MG/1
100 CAPSULE ORAL 2 TIMES DAILY
Qty: 10 CAPSULE | Refills: 0 | Status: SHIPPED | OUTPATIENT
Start: 2025-01-07 | End: 2025-01-12

## 2025-01-07 NOTE — PROGRESS NOTES
Virtual Regular Visit  Name: Anali Red      : 1995      MRN: [id number]  Encounter Provider: Your Video Visit Provider  Encounter Date: 2025   Encounter department: VIRTUAL CARE       Verification of patient location:  Patient is located at Other in the following state in which I hold an active license [place] :  Assessment & Plan  Urinary tract infection without hematuria, site unspecified    Orders:    UA w Reflex to Microscopic w Reflex to Culture; Future    nitrofurantoin (MACROBID) 100 mg capsule; Take 1 capsule (100 mg total) by mouth 2 (two) times a day for 5 days        Encounter provider Your Video Visit Provider    The patient was identified by name and date of birth. Anali Red was informed that this is a telemedicine visit and that the visit is being conducted through the Epic Embedded platform. She agrees to proceed..  My office door was closed. No one else was in the room.  She acknowledged consent and understanding of privacy and security of the video platform. The patient has agreed to participate and understands they can discontinue the visit at any time.    Patient is aware this is a billable service.     History was obtained from: History obtained from: patient  History of Present Illness     This is a 29 year old female here today for video visit.  She states she been having urinary frequency and dysuria.  Symptoms started yesterday.  She denies any fevers, barraza aches or chills.She has had UTI in the past symptoms are similar.          Review of Systems   Constitutional:  Negative for activity change, chills, fatigue and fever.   Genitourinary:  Positive for dysuria, frequency and urgency.   Skin: Negative.    Neurological: Negative.    Psychiatric/Behavioral: Negative.         Objective   LMP 2024 (Exact Date)     Physical Exam  Constitutional:       General: She is not in acute distress.     Appearance: Normal appearance. She is not ill-appearing or toxic-appearing.   HENT:       Head: Normocephalic and atraumatic.   Pulmonary:      Effort: Pulmonary effort is normal.   Neurological:      Mental Status: She is alert and oriented to person, place, and time.   Psychiatric:         Mood and Affect: Mood normal.         Behavior: Behavior normal.         Thought Content: Thought content normal.         Judgment: Judgment normal.         Visit Time  Total Visit Duration: 6 minutes not including the time spent for establishing the audio/video connection.

## 2025-01-07 NOTE — PATIENT INSTRUCTIONS
UA as ordered. Start antibiotic. Take probiotic.  Increase oral fluids.  Tylenol or Motrin for pain or fever.  Azo for bladder spasms.  Follow up with PCP if no improvement.  Go to ER with abd pain, flank pain or worsening symptoms.       Urinary Tract Infection in Women   WHAT YOU NEED TO KNOW:   A urinary tract infection (UTI) is caused by bacteria that get inside your urinary tract. Most bacteria that enter your urinary tract come out when you urinate. If the bacteria stay in your urinary tract, you may get an infection. Your urinary tract includes your kidneys, ureters, bladder, and urethra. Urine is made in your kidneys, and it flows from the ureters to the bladder. Urine leaves the bladder through the urethra. A UTI is more common in your lower urinary tract, which includes your bladder and urethra.        DISCHARGE INSTRUCTIONS:   Return to the emergency department if:   You are urinating very little or not at all.    You have a high fever with shaking chills.     You have side or back pain that gets worse.  Contact your healthcare provider if:   You have a fever.    You do not feel better after 2 days of taking antibiotics.    You are vomiting.     You have questions or concerns about your condition or care.  Medicines:   Antibiotics  help fight a bacterial infection.     Medicines  may be given to decrease pain and burning when you urinate. They will also help decrease the feeling that you need to urinate often. These medicines will make your urine orange or red.    Take your medicine as directed.  Contact your healthcare provider if you think your medicine is not helping or if you have side effects. Tell him or her if you are allergic to any medicine. Keep a list of the medicines, vitamins, and herbs you take. Include the amounts, and when and why you take them. Bring the list or the pill bottles to follow-up visits. Carry your medicine list with you in case of an emergency.  Follow up with your healthcare  provider as directed:  Write down your questions so you remember to ask them during your visits.   Prevent another UTI:   Empty your bladder often.  Urinate and empty your bladder as soon as you feel the need. Do not hold your urine for long periods of time.    Wipe from front to back after you urinate or have a bowel movement.  This will help prevent germs from getting into your urinary tract through your urethra.    Drink liquids as directed.  Ask how much liquid to drink each day and which liquids are best for you. You may need to drink more liquids than usual to help flush out the bacteria. Do not drink alcohol, caffeine, or citrus juices. These can irritate your bladder and increase your symptoms. Your healthcare provider may recommend cranberry juice to help prevent a UTI.    Urinate after you have sex.  This can help flush out bacteria passed during sex.    Do not douche or use feminine deodorants.  These can change the chemical balance in your vagina.    Change sanitary pads or tampons often.  This will help prevent germs from getting into your urinary tract.     Do pelvic muscle exercises often.  Pelvic muscle exercises may help you start and stop urinating. Strong pelvic muscles may help you empty your bladder easier. Squeeze these muscles tightly for 5 seconds like you are trying to hold back urine. Then relax for 5 seconds. Gradually work up to squeezing for 10 seconds. Do 3 sets of 15 repetitions a day, or as directed.  © 2017 [employer] Information is for End User's use only and may not be sold, redistributed or otherwise used for commercial purposes. All illustrations and images included in CareNotes® are the copyrighted property of [employer] or [employer].  The above information is an  only. It is not intended as medical advice for individual conditions or treatments. Talk to your doctor, nurse or pharmacist before following any medical regimen to see  if it is safe and effective for you.

## 2025-01-10 RX ORDER — SEMAGLUTIDE 1.7 MG/.75ML
INJECTION, SOLUTION SUBCUTANEOUS
Qty: 3 ML | Refills: 0 | Status: SHIPPED | OUTPATIENT
Start: 2025-01-10

## 2025-01-13 ENCOUNTER — APPOINTMENT (OUTPATIENT)
Dept: LAB | Facility: HOSPITAL | Age: 30
End: 2025-01-13
Payer: COMMERCIAL

## 2025-01-13 DIAGNOSIS — Z11.3 ROUTINE SCREENING FOR STI (SEXUALLY TRANSMITTED INFECTION): ICD-10-CM

## 2025-01-13 DIAGNOSIS — R73.03 PREDIABETES: ICD-10-CM

## 2025-01-13 LAB
ALBUMIN SERPL BCG-MCNC: 4.2 G/DL (ref 3.5–5)
ALP SERPL-CCNC: 55 U/L (ref 34–104)
ALT SERPL W P-5'-P-CCNC: 18 U/L (ref 7–52)
ANION GAP SERPL CALCULATED.3IONS-SCNC: 5 MMOL/L (ref 4–13)
AST SERPL W P-5'-P-CCNC: 16 U/L (ref 13–39)
BILIRUB SERPL-MCNC: 0.36 MG/DL (ref 0.2–1)
BUN SERPL-MCNC: 12 MG/DL (ref 5–25)
CALCIUM SERPL-MCNC: 9.4 MG/DL (ref 8.4–10.2)
CHLORIDE SERPL-SCNC: 105 MMOL/L (ref 96–108)
CO2 SERPL-SCNC: 26 MMOL/L (ref 21–32)
CREAT SERPL-MCNC: 0.76 MG/DL (ref 0.6–1.3)
GFR SERPL CREATININE-BSD FRML MDRD: 104 ML/MIN/1.73SQ M
GLUCOSE SERPL-MCNC: 83 MG/DL (ref 65–140)
POTASSIUM SERPL-SCNC: 4.4 MMOL/L (ref 3.5–5.3)
PROT SERPL-MCNC: 7 G/DL (ref 6.4–8.4)
SODIUM SERPL-SCNC: 136 MMOL/L (ref 135–147)

## 2025-01-13 PROCEDURE — 36415 COLL VENOUS BLD VENIPUNCTURE: CPT

## 2025-01-13 PROCEDURE — 86696 HERPES SIMPLEX TYPE 2 TEST: CPT

## 2025-01-13 PROCEDURE — 80053 COMPREHEN METABOLIC PANEL: CPT

## 2025-01-13 PROCEDURE — 86695 HERPES SIMPLEX TYPE 1 TEST: CPT

## 2025-01-15 LAB
HSV2 IGG SERPL QL IA: NEGATIVE
HSV2 IGG SERPL QL IA: POSITIVE

## 2025-01-21 DIAGNOSIS — Z30.41 ORAL CONTRACEPTIVE PILL SURVEILLANCE: ICD-10-CM

## 2025-01-21 RX ORDER — NORGESTIMATE AND ETHINYL ESTRADIOL 0.25-0.035
1 KIT ORAL DAILY
Qty: 84 TABLET | Refills: 1 | Status: SHIPPED | OUTPATIENT
Start: 2025-01-21

## 2025-02-10 ENCOUNTER — OFFICE VISIT (OUTPATIENT)
Dept: FAMILY MEDICINE CLINIC | Facility: CLINIC | Age: 30
End: 2025-02-10
Payer: COMMERCIAL

## 2025-02-10 ENCOUNTER — APPOINTMENT (OUTPATIENT)
Dept: LAB | Facility: CLINIC | Age: 30
End: 2025-02-10
Payer: COMMERCIAL

## 2025-02-10 VITALS
BODY MASS INDEX: 30.31 KG/M2 | WEIGHT: 200 LBS | HEART RATE: 68 BPM | OXYGEN SATURATION: 97 % | HEIGHT: 68 IN | SYSTOLIC BLOOD PRESSURE: 122 MMHG | DIASTOLIC BLOOD PRESSURE: 78 MMHG

## 2025-02-10 DIAGNOSIS — R39.9 UTI SYMPTOMS: ICD-10-CM

## 2025-02-10 DIAGNOSIS — E03.9 ACQUIRED HYPOTHYROIDISM: ICD-10-CM

## 2025-02-10 DIAGNOSIS — E03.9 HYPOTHYROIDISM, UNSPECIFIED TYPE: ICD-10-CM

## 2025-02-10 DIAGNOSIS — F41.9 ANXIETY: ICD-10-CM

## 2025-02-10 DIAGNOSIS — E78.00 HYPERCHOLESTEROLEMIA: ICD-10-CM

## 2025-02-10 DIAGNOSIS — R39.9 UTI SYMPTOMS: Primary | ICD-10-CM

## 2025-02-10 LAB
BILIRUB UR QL STRIP: NEGATIVE
CLARITY UR: ABNORMAL
COLOR UR: YELLOW
GLUCOSE UR STRIP-MCNC: NEGATIVE MG/DL
HGB UR QL STRIP.AUTO: ABNORMAL
KETONES UR STRIP-MCNC: NEGATIVE MG/DL
LEUKOCYTE ESTERASE UR QL STRIP: ABNORMAL
NITRITE UR QL STRIP: NEGATIVE
PH UR STRIP.AUTO: 6 [PH]
PROT UR STRIP-MCNC: ABNORMAL MG/DL
SP GR UR STRIP.AUTO: 1.03 (ref 1–1.03)
UROBILINOGEN UR STRIP-ACNC: <2 MG/DL

## 2025-02-10 PROCEDURE — 87086 URINE CULTURE/COLONY COUNT: CPT

## 2025-02-10 PROCEDURE — 81001 URINALYSIS AUTO W/SCOPE: CPT

## 2025-02-10 PROCEDURE — 99214 OFFICE O/P EST MOD 30 MIN: CPT | Performed by: INTERNAL MEDICINE

## 2025-02-10 RX ORDER — LEVOTHYROXINE SODIUM 150 MCG
150 TABLET ORAL
Qty: 90 TABLET | Refills: 0 | Status: SHIPPED | OUTPATIENT
Start: 2025-02-10

## 2025-02-10 RX ORDER — SEMAGLUTIDE 1.7 MG/.75ML
INJECTION, SOLUTION SUBCUTANEOUS
Qty: 3 ML | Refills: 0 | Status: SHIPPED | OUTPATIENT
Start: 2025-02-10

## 2025-02-10 NOTE — ASSESSMENT & PLAN NOTE
Patient is being followed by the psychiatrist currently she is taking Remeron, Trileptal, Xanax as needed.

## 2025-02-10 NOTE — PROGRESS NOTES
Name: Anali Red      : 1995      MRN: [id number]  Encounter Provider: Rasta Benoit MD  Encounter Date: 2/10/2025   Encounter department: [facility] PRIMARY C[facility] [place]  :  Assessment & Plan  UTI symptoms  Patient symptoms suggestive of UTI with increased frequency with urination some burning sensation lower abdominal discomfort recent episode of similar symptoms was on nitrofurantoin.  Exam is unremarkable except for mild tenderness in the lower part of the abdomen.  Will get a urinalysis and culture based on that we will make decisions and follow-up recommend patient drink more fluids cranberry capsules.  Orders:  •  UA w Reflex to Microscopic w Reflex to Culture; Future    Hypothyroidism, unspecified type    Orders:  •  Synthroid 150 MCG tablet; Take 1 tablet (150 mcg total) by mouth daily in the early morning    Acquired hypothyroidism  Patient is currently taking brand-name Synthroid 150 mcg daily last TSH done in the month of October came back normal at 0.830 we will continue with the same dose for now       BMI 32.0-32.9,adult  BMI is at 30.41 much better compared to before she lost about 12 pounds in last couple of months time currently on Wegovy 1.7 mg once a week we will continue with the same dose for now follow-up with periodic weight checks  Orders:  •  Semaglutide-Weight Management (Wegovy) 1.7 MG/0.75ML; Inject 1.7 mg under the skin weekly    Hypercholesterolemia  Lipid profile done in October showed cholesterol 194 triglycerides 128 HDL 53 LDL at 115       Anxiety  Patient is being followed by the psychiatrist currently she is taking Remeron, Trileptal, Xanax as needed.              History of Present Illness   Patient is coming for evaluation regarding symptoms suggestive of urinary tract infection with increased frequency with urination sometimes burning sensation passing urine with some lower abdominal discomfort but no pain in the flank area.  Patient recently had symptoms  "suggestive of UTI and was prescribed nitrofurantoin by the telemedicine physician.  Patient denies fever chills or any other associated symptoms.  Recently she had workup done for an STD came back unremarkable except for HSV virus IgG antibodies no medication has been given.  She was also seen by the gynecologist.    Patient with a history of hypothyroidism on Synthroid brand name 150 mcg daily    Patient is also on Wegovy and has been gradually losing weight      Review of Systems   Constitutional:  Negative for chills and fever.   HENT:  Negative for ear pain and sore throat.    Eyes:  Negative for pain and visual disturbance.   Respiratory:  Negative for cough and shortness of breath.    Cardiovascular:  Negative for chest pain and palpitations.   Gastrointestinal:  Negative for abdominal pain and vomiting.   Genitourinary:  Positive for frequency. Negative for dysuria and hematuria.   Musculoskeletal:  Negative for arthralgias and back pain.   Skin:  Negative for color change and rash.   Neurological:  Negative for seizures and syncope.   All other systems reviewed and are negative.      Objective   /78 (BP Location: Right arm, Patient Position: Sitting, Cuff Size: Standard)   Pulse 68   Ht 5' 8\" (1.727 m)   Wt 90.7 kg (200 lb)   SpO2 97%   BMI 30.41 kg/m²      Physical Exam  Vitals and nursing note reviewed.   Constitutional:       General: She is not in acute distress.     Appearance: She is well-developed.   HENT:      Head: Normocephalic and atraumatic.   Eyes:      Conjunctiva/sclera: Conjunctivae normal.   Cardiovascular:      Rate and Rhythm: Normal rate and regular rhythm.      Heart sounds: No murmur heard.  Pulmonary:      Effort: Pulmonary effort is normal. No respiratory distress.      Breath sounds: Normal breath sounds.   Abdominal:      Palpations: Abdomen is soft.      Tenderness: There is no abdominal tenderness.   Musculoskeletal:         General: No swelling.      Cervical back: " Neck supple.   Skin:     General: Skin is warm and dry.      Capillary Refill: Capillary refill takes less than 2 seconds.   Neurological:      Mental Status: She is alert.   Psychiatric:         Mood and Affect: Mood normal.       Recent Results (from the past 8 weeks)   UA w Reflex to Microscopic w Reflex to Culture    Collection Time: 01/07/25  1:19 PM    Specimen: Urine   Result Value Ref Range    Color, UA Light Yellow     Clarity, UA Clear     Specific Gravity, UA 1.008 1.003 - 1.030    pH, UA 6.0 4.5, 5.0, 5.5, 6.0, 6.5, 7.0, 7.5, 8.0    Leukocytes, UA Small (A) Negative    Nitrite, UA Negative Negative    Protein, UA Negative Negative mg/dl    Glucose, UA Negative Negative mg/dl    Ketones, UA Negative Negative mg/dl    Urobilinogen, UA <2.0 <2.0 mg/dl mg/dl    Bilirubin, UA Negative Negative    Occult Blood, UA Trace (A) Negative   Urine Microscopic    Collection Time: 01/07/25  1:19 PM   Result Value Ref Range    RBC, UA 1-2 None Seen, 1-2 /hpf    WBC, UA 1-2 None Seen, 1-2 /hpf    Epithelial Cells Occasional None Seen, Occasional /hpf    Bacteria, UA Occasional None Seen, Occasional /hpf    MUCUS THREADS Occasional (A) None Seen   Comprehensive metabolic panel    Collection Time: 01/13/25 10:30 AM   Result Value Ref Range    Sodium 136 135 - 147 mmol/L    Potassium 4.4 3.5 - 5.3 mmol/L    Chloride 105 96 - 108 mmol/L    CO2 26 21 - 32 mmol/L    ANION GAP 5 4 - 13 mmol/L    BUN 12 5 - 25 mg/dL    Creatinine 0.76 0.60 - 1.30 mg/dL    Glucose 83 65 - 140 mg/dL    Calcium 9.4 8.4 - 10.2 mg/dL    AST 16 13 - 39 U/L    ALT 18 7 - 52 U/L    Alkaline Phosphatase 55 34 - 104 U/L    Total Protein 7.0 6.4 - 8.4 g/dL    Albumin 4.2 3.5 - 5.0 g/dL    Total Bilirubin 0.36 0.20 - 1.00 mg/dL    eGFR 104 ml/min/1.73sq m   Herpes I/II IgG Antibodies    Collection Time: 01/13/25 10:30 AM   Result Value Ref Range    HSV 1 IgG Positive (A) Negative    HSV 2 IgG Negative Negative

## 2025-02-10 NOTE — ASSESSMENT & PLAN NOTE
BMI is at 30.41 much better compared to before she lost about 12 pounds in last couple of months time currently on Wegovy 1.7 mg once a week we will continue with the same dose for now follow-up with periodic weight checks  Orders:  •  Semaglutide-Weight Management (Wegovy) 1.7 MG/0.75ML; Inject 1.7 mg under the skin weekly

## 2025-02-10 NOTE — ASSESSMENT & PLAN NOTE
Patient symptoms suggestive of UTI with increased frequency with urination some burning sensation lower abdominal discomfort recent episode of similar symptoms was on nitrofurantoin.  Exam is unremarkable except for mild tenderness in the lower part of the abdomen.  Will get a urinalysis and culture based on that we will make decisions and follow-up recommend patient drink more fluids cranberry capsules.  Orders:  •  UA w Reflex to Microscopic w Reflex to Culture; Future

## 2025-02-11 LAB
BACTERIA UR QL AUTO: ABNORMAL /HPF
CAOX CRY URNS QL MICRO: ABNORMAL /HPF
MUCOUS THREADS UR QL AUTO: ABNORMAL
NON-SQ EPI CELLS URNS QL MICRO: ABNORMAL /HPF
RBC #/AREA URNS AUTO: ABNORMAL /HPF
WBC #/AREA URNS AUTO: ABNORMAL /HPF

## 2025-02-12 LAB — BACTERIA UR CULT: NORMAL

## 2025-02-18 ENCOUNTER — PATIENT MESSAGE (OUTPATIENT)
Dept: FAMILY MEDICINE CLINIC | Facility: CLINIC | Age: 30
End: 2025-02-18

## 2025-02-18 ENCOUNTER — DOCUMENTATION (OUTPATIENT)
Dept: FAMILY MEDICINE CLINIC | Facility: CLINIC | Age: 30
End: 2025-02-18

## 2025-02-20 NOTE — PATIENT COMMUNICATION
Anali called because she can not find her letter in her chart - routed letter to patient Rafaelneno.   No further action required

## 2025-03-11 RX ORDER — SEMAGLUTIDE 1.7 MG/.75ML
INJECTION, SOLUTION SUBCUTANEOUS
Qty: 3 ML | Refills: 0 | Status: CANCELLED | OUTPATIENT
Start: 2025-03-11

## 2025-03-26 RX ORDER — SEMAGLUTIDE 1.7 MG/.75ML
INJECTION, SOLUTION SUBCUTANEOUS
Qty: 3 ML | Refills: 0 | Status: SHIPPED | OUTPATIENT
Start: 2025-03-26

## 2025-04-15 DIAGNOSIS — Z30.41 ORAL CONTRACEPTIVE PILL SURVEILLANCE: ICD-10-CM

## 2025-04-16 RX ORDER — NORGESTIMATE AND ETHINYL ESTRADIOL 0.25-0.035
1 KIT ORAL DAILY
Qty: 84 TABLET | Refills: 1 | Status: SHIPPED | OUTPATIENT
Start: 2025-04-16

## 2025-05-27 ENCOUNTER — NURSE TRIAGE (OUTPATIENT)
Age: 30
End: 2025-05-27

## 2025-05-27 DIAGNOSIS — Z32.01 POSITIVE PREGNANCY TEST: Primary | ICD-10-CM

## 2025-05-27 NOTE — TELEPHONE ENCOUNTER
"REASON FOR CONVERSATION: Pregnancy Test    SYMPTOMS: cramping    OTHER HEALTH INFORMATION: LMP 5/2- Positive pregnancy test today. Patient unsure if she'd like to continue. Patient concerned about meds and would like HCG to confirm pregnancy.     PROTOCOL DISPOSITION: Home Care    CARE ADVICE PROVIDED: Monitor for severe pain or vaginal bleeding. Can use tylenol, increase hydration and rest to help alleviate.     PRACTICE FOLLOW-UP: n/a      Reason for Disposition   MILD abdominal pain (e.g., doesn't interfere with normal activities)    Answer Assessment - Initial Assessment Questions  1. LOCATION: \"Where does it hurt?\"       Pelvic pain  2. RADIATION: \"Does the pain shoot anywhere else?\" (e.g., chest, back, shoulder)      no  3. ONSET: \"When did the pain begin?\" (e.g., minutes, hours or days ago)       3 days ago  4. ONSET: \"Gradual or sudden onset?\"      gradual  5. PATTERN \"Does the pain come and go, or has it been constant since it started?\"       Comes and goes  6. SEVERITY: \"How bad is the pain?\" \"What does it keep you from doing?\"  (e.g., Scale 1-10; mild, moderate, or severe)      Sharp--7/10  7. RECURRENT SYMPTOM: \"Have you ever had this type of stomach pain before?\" If Yes, ask: \"When was the last time?\" and \"What happened that time?\"       no  8. CAUSE: \"What do you think is causing the stomach pain?\"      unsure  9. RELIEVING/AGGRAVATING FACTORS: \"What makes it better or worse?\" (e.g., antacids, bowel movement, movement)      N/a  10. OTHER SYMPTOMS: \"Do you have any other symptoms?\" (e.g., back pain, diarrhea, fever, urination pain, vaginal bleeding, vaginal discharge, vomiting)        N/a  11. DARIA: \"What date are you expecting to deliver?\"        LMP 5/2    Protocols used: Pregnancy - Abdominal Pain Less Than 20 Weeks EGA-Adult-[place]    "

## 2025-05-28 ENCOUNTER — APPOINTMENT (OUTPATIENT)
Dept: LAB | Facility: HOSPITAL | Age: 30
End: 2025-05-28
Payer: COMMERCIAL

## 2025-05-28 DIAGNOSIS — Z32.01 POSITIVE PREGNANCY TEST: ICD-10-CM

## 2025-05-28 LAB — B-HCG SERPL-ACNC: ABNORMAL MIU/ML (ref 0–5)

## 2025-05-28 PROCEDURE — 36415 COLL VENOUS BLD VENIPUNCTURE: CPT

## 2025-05-28 PROCEDURE — 84702 CHORIONIC GONADOTROPIN TEST: CPT

## 2025-05-28 NOTE — TELEPHONE ENCOUNTER
Patient called back, reviewed orders and to complete 48 hours apart. Patient verbalzied understanding and thankful, no questions.

## 2025-05-29 DIAGNOSIS — E03.9 HYPOTHYROIDISM, UNSPECIFIED TYPE: ICD-10-CM

## 2025-05-29 RX ORDER — LEVOTHYROXINE SODIUM 150 MCG
150 TABLET ORAL
Qty: 90 TABLET | Refills: 1 | Status: SHIPPED | OUTPATIENT
Start: 2025-05-29

## 2025-05-29 NOTE — TELEPHONE ENCOUNTER
Called and lvm for patient to call back and let us know if 6/5/25 at 3pm works for her. Pt is scheduled as of now.

## 2025-05-29 NOTE — TELEPHONE ENCOUNTER
Patient informed of providers response. D&V appointment availability discussed with patient - first available 6/13. Patient is not comfortable waiting this long to be evaluated. Inquiring if radiology ultrasound would be able to be ordered.     Routing to provider for review.

## 2025-05-29 NOTE — TELEPHONE ENCOUNTER
Patient states she viewed her HCG level 41,202 and is concerned for high level, she asked for provider to review as soon as possible as she is unsure about how she wants to proceed or if level changes options.  Patient has a f/u HCG ordered.  HP sent to Dr. Church.

## 2025-05-30 ENCOUNTER — APPOINTMENT (OUTPATIENT)
Dept: LAB | Facility: HOSPITAL | Age: 30
End: 2025-05-30
Payer: COMMERCIAL

## 2025-05-30 ENCOUNTER — RESULTS FOLLOW-UP (OUTPATIENT)
Age: 30
End: 2025-05-30

## 2025-05-30 DIAGNOSIS — Z00.8 HEALTH EXAMINATION IN POPULATION SURVEY: ICD-10-CM

## 2025-05-30 DIAGNOSIS — Z32.01 POSITIVE PREGNANCY TEST: ICD-10-CM

## 2025-05-30 LAB
B-HCG SERPL-ACNC: ABNORMAL MIU/ML (ref 0–5)
CHOLEST SERPL-MCNC: 188 MG/DL (ref ?–200)
EST. AVERAGE GLUCOSE BLD GHB EST-MCNC: 97 MG/DL
HBA1C MFR BLD: 5 %
HDLC SERPL-MCNC: 55 MG/DL
LDLC SERPL CALC-MCNC: 117 MG/DL (ref 0–100)
NONHDLC SERPL-MCNC: 133 MG/DL
TRIGL SERPL-MCNC: 80 MG/DL (ref ?–150)

## 2025-05-30 PROCEDURE — 83036 HEMOGLOBIN GLYCOSYLATED A1C: CPT

## 2025-05-30 PROCEDURE — 84702 CHORIONIC GONADOTROPIN TEST: CPT

## 2025-05-30 PROCEDURE — 36415 COLL VENOUS BLD VENIPUNCTURE: CPT

## 2025-05-30 PROCEDURE — 80061 LIPID PANEL: CPT

## 2025-06-05 ENCOUNTER — ULTRASOUND (OUTPATIENT)
Dept: OBGYN CLINIC | Facility: CLINIC | Age: 30
End: 2025-06-05
Payer: COMMERCIAL

## 2025-06-05 VITALS
SYSTOLIC BLOOD PRESSURE: 120 MMHG | HEIGHT: 68 IN | DIASTOLIC BLOOD PRESSURE: 74 MMHG | WEIGHT: 186 LBS | BODY MASS INDEX: 28.19 KG/M2

## 2025-06-05 DIAGNOSIS — Z32.01 ENCOUNTER FOR PREGNANCY TEST, RESULT POSITIVE: Primary | ICD-10-CM

## 2025-06-05 PROBLEM — Z86.19 HX OF HERPES GENITALIS: Status: ACTIVE | Noted: 2025-06-05

## 2025-06-05 PROCEDURE — 99213 OFFICE O/P EST LOW 20 MIN: CPT | Performed by: OBSTETRICS & GYNECOLOGY

## 2025-06-05 PROCEDURE — 76817 TRANSVAGINAL US OBSTETRIC: CPT | Performed by: OBSTETRICS & GYNECOLOGY

## 2025-06-05 NOTE — PROGRESS NOTES
"Subjective:     Anali Red is a 30 y.o.  female who presents with positive pregnancy test and rising bhcg's. This was an unplanned pregnancy as she was on birth control pills at the time. She stopped taking her xanax and adderall when she found out she was pregnant. 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    Physical Exam  Constitutional:       Appearance: She is well-developed.     Cardiovascular:      Rate and Rhythm: Normal rate and regular rhythm.      Heart sounds: Normal heart sounds. No murmur heard.     No friction rub. No gallop.   Pulmonary:      Effort: Pulmonary effort is normal. No respiratory distress.      Breath sounds: No wheezing.   Abdominal:      Palpations: Abdomen is soft.      Tenderness: There is no abdominal tenderness.     Musculoskeletal:         General: No tenderness.     Neurological:      Mental Status: She is alert and oriented to person, place, and time.   Vitals reviewed.         FIRST TRIMESTER OBSTETRIC ULTRASOUND     INDICATION: Amenorrhea, viability    COMPARISON: None.     TECHNIQUE:   Transvaginal imaging was performed to assess the gestation, myometrial/endometrial architecture and ovarian parenchymal detail.    The study includes volumetric sweeps and traditional still imaging technique.      FINDINGS:     A single intrauterine gestation is identified.  Cardiac activity is detected at 152bpm.      YOLK SAC:  Present and normal in size and appearance.  MEAN CROWN RUMP LENGTH:  1.28 cm = 7 weeks 3 days   AMNIOTIC FLUID/SAC SHAPE:  Within expected normal range.     UTERUS/ADNEXA:   No adnexal mass or pathologic cyst.  No free fluid identified.     IMPRESSION:      Assigning a Final DARIA  Please choose how you are assigning the DARIA: The LMP is unknown or uncertain, therefore the final DARIA will be based on today's ultrasound    Final DARIA: 26 by ultrasound at this " encounter.        Assessment/Plan:    Problem List Items Addressed This Visit    None        Frances Church MD  6/5/2025  3:12 PM

## 2025-06-24 ENCOUNTER — HOSPITAL ENCOUNTER (EMERGENCY)
Facility: HOSPITAL | Age: 30
Discharge: HOME | End: 2025-06-24
Attending: INTERNAL MEDICINE | Admitting: INTERNAL MEDICINE
Payer: COMMERCIAL

## 2025-06-24 VITALS
TEMPERATURE: 97.5 F | OXYGEN SATURATION: 100 % | DIASTOLIC BLOOD PRESSURE: 62 MMHG | HEART RATE: 62 BPM | RESPIRATION RATE: 20 BRPM | SYSTOLIC BLOOD PRESSURE: 126 MMHG

## 2025-06-24 DIAGNOSIS — F41.9 ANXIETY: ICD-10-CM

## 2025-06-24 DIAGNOSIS — R10.9 ABDOMINAL PAIN DURING PREGNANCY IN FIRST TRIMESTER: Primary | ICD-10-CM

## 2025-06-24 DIAGNOSIS — O26.891 ABDOMINAL PAIN DURING PREGNANCY IN FIRST TRIMESTER: Primary | ICD-10-CM

## 2025-06-24 LAB
ALBUMIN SERPL BCG-MCNC: 4 G/DL (ref 3.5–5)
ALP SERPL-CCNC: 58 U/L (ref 34–104)
ALT SERPL W P-5'-P-CCNC: 13 U/L (ref 7–52)
ANION GAP SERPL CALCULATED.3IONS-SCNC: 10 MMOL/L (ref 4–13)
AST SERPL W P-5'-P-CCNC: 14 U/L (ref 13–39)
BACTERIA UR QL AUTO: ABNORMAL /HPF
BASOPHILS # BLD AUTO: 0.05 THOUSANDS/ÂΜL (ref 0–0.1)
BASOPHILS NFR BLD AUTO: 0 % (ref 0–1)
BILIRUB SERPL-MCNC: 0.21 MG/DL (ref 0.2–1)
BILIRUB UR QL STRIP: NEGATIVE
BUN SERPL-MCNC: 11 MG/DL (ref 5–25)
CALCIUM SERPL-MCNC: 9.4 MG/DL (ref 8.4–10.2)
CHLORIDE SERPL-SCNC: 102 MMOL/L (ref 96–108)
CLARITY UR: CLEAR
CO2 SERPL-SCNC: 23 MMOL/L (ref 21–32)
COLOR UR: YELLOW
CREAT SERPL-MCNC: 0.54 MG/DL (ref 0.6–1.3)
EOSINOPHIL # BLD AUTO: 0.15 THOUSAND/ÂΜL (ref 0–0.61)
EOSINOPHIL NFR BLD AUTO: 1 % (ref 0–6)
ERYTHROCYTE [DISTWIDTH] IN BLOOD BY AUTOMATED COUNT: 12.6 % (ref 11.6–15.1)
GFR SERPL CREATININE-BSD FRML MDRD: 125 ML/MIN/1.73SQ M
GLUCOSE SERPL-MCNC: 75 MG/DL (ref 65–140)
GLUCOSE UR STRIP-MCNC: NEGATIVE MG/DL
HCT VFR BLD AUTO: 36.8 % (ref 34.8–46.1)
HGB BLD-MCNC: 12.3 G/DL (ref 11.5–15.4)
HGB UR QL STRIP.AUTO: ABNORMAL
IMM GRANULOCYTES # BLD AUTO: 0.04 THOUSAND/UL (ref 0–0.2)
IMM GRANULOCYTES NFR BLD AUTO: 0 % (ref 0–2)
KETONES UR STRIP-MCNC: NEGATIVE MG/DL
LEUKOCYTE ESTERASE UR QL STRIP: ABNORMAL
LYMPHOCYTES # BLD AUTO: 2.97 THOUSANDS/ÂΜL (ref 0.6–4.47)
LYMPHOCYTES NFR BLD AUTO: 22 % (ref 14–44)
MCH RBC QN AUTO: 30.1 PG (ref 26.8–34.3)
MCHC RBC AUTO-ENTMCNC: 33.4 G/DL (ref 31.4–37.4)
MCV RBC AUTO: 90 FL (ref 82–98)
MONOCYTES # BLD AUTO: 0.83 THOUSAND/ÂΜL (ref 0.17–1.22)
MONOCYTES NFR BLD AUTO: 6 % (ref 4–12)
NEUTROPHILS # BLD AUTO: 9.39 THOUSANDS/ÂΜL (ref 1.85–7.62)
NEUTS SEG NFR BLD AUTO: 71 % (ref 43–75)
NITRITE UR QL STRIP: NEGATIVE
NON-SQ EPI CELLS URNS QL MICRO: ABNORMAL /HPF
NRBC BLD AUTO-RTO: 0 /100 WBCS
PH UR STRIP.AUTO: 6 [PH]
PLATELET # BLD AUTO: 262 THOUSANDS/UL (ref 149–390)
PMV BLD AUTO: 9.2 FL (ref 8.9–12.7)
POTASSIUM SERPL-SCNC: 3.7 MMOL/L (ref 3.5–5.3)
PROT SERPL-MCNC: 7 G/DL (ref 6.4–8.4)
PROT UR STRIP-MCNC: NEGATIVE MG/DL
RBC # BLD AUTO: 4.08 MILLION/UL (ref 3.81–5.12)
RBC #/AREA URNS AUTO: ABNORMAL /HPF
SODIUM SERPL-SCNC: 135 MMOL/L (ref 135–147)
SP GR UR STRIP.AUTO: <=1.005 (ref 1–1.03)
TSH SERPL DL<=0.05 MIU/L-ACNC: 0.77 UIU/ML (ref 0.45–4.5)
UROBILINOGEN UR QL STRIP.AUTO: 0.2 E.U./DL
WBC # BLD AUTO: 13.43 THOUSAND/UL (ref 4.31–10.16)
WBC #/AREA URNS AUTO: ABNORMAL /HPF

## 2025-06-24 PROCEDURE — 87086 URINE CULTURE/COLONY COUNT: CPT | Performed by: PHYSICIAN ASSISTANT

## 2025-06-24 PROCEDURE — 99284 EMERGENCY DEPT VISIT MOD MDM: CPT | Performed by: PHYSICIAN ASSISTANT

## 2025-06-24 PROCEDURE — 87147 CULTURE TYPE IMMUNOLOGIC: CPT | Performed by: PHYSICIAN ASSISTANT

## 2025-06-24 PROCEDURE — 85025 COMPLETE CBC W/AUTO DIFF WBC: CPT | Performed by: PHYSICIAN ASSISTANT

## 2025-06-24 PROCEDURE — 96365 THER/PROPH/DIAG IV INF INIT: CPT

## 2025-06-24 PROCEDURE — 36415 COLL VENOUS BLD VENIPUNCTURE: CPT | Performed by: PHYSICIAN ASSISTANT

## 2025-06-24 PROCEDURE — 80053 COMPREHEN METABOLIC PANEL: CPT | Performed by: PHYSICIAN ASSISTANT

## 2025-06-24 PROCEDURE — 99283 EMERGENCY DEPT VISIT LOW MDM: CPT

## 2025-06-24 PROCEDURE — 81001 URINALYSIS AUTO W/SCOPE: CPT | Performed by: PHYSICIAN ASSISTANT

## 2025-06-24 PROCEDURE — 84443 ASSAY THYROID STIM HORMONE: CPT | Performed by: PHYSICIAN ASSISTANT

## 2025-06-24 RX ORDER — ACETAMINOPHEN 10 MG/ML
1000 INJECTION, SOLUTION INTRAVENOUS ONCE
Status: COMPLETED | OUTPATIENT
Start: 2025-06-24 | End: 2025-06-24

## 2025-06-24 RX ADMIN — ACETAMINOPHEN 1000 MG: 10 INJECTION INTRAVENOUS at 17:57

## 2025-06-24 RX ADMIN — SODIUM CHLORIDE 1000 ML: 0.9 INJECTION, SOLUTION INTRAVENOUS at 17:55

## 2025-06-24 NOTE — ED PROVIDER NOTES
Time reflects when diagnosis was documented in both MDM as applicable and the Disposition within this note       Time User Action Codes Description Comment    6/24/2025  6:55 PM Lia Lopez [O26.891,  R10.9] Abdominal pain during pregnancy in first trimester     6/24/2025  6:55 PM Lia Lopez [F41.9] Anxiety           ED Disposition       ED Disposition   Discharge    Condition   Stable    Date/Time   Tue Jun 24, 2025  6:55 PM    Comment   Anali YUN Dilts discharge to home/self care.                   Assessment & Plan       Medical Decision Making  Pt with diffuse lower abd pain, headache, dizziness, chest pressure/heaviness.  Consider threatened miscarriage, round ligament pain, gastroenteritis, dehydration, infection, arrhythmia, anxiety, among others.  Doubt cardiac origin as patient has no medical history, EKG is normal, vital signs stable.    Labs are reassuring, vss, exam non focal.  Pt states was taking xanax daily, but stopped due to pregnancy; so has been having some issues with anxiety, her OB wants her to follow-up with maternal-fetal medicine but that is not for another month and a half.  We discussed using Benadryl 25 to 50 mg nightly for as needed, breathing techniques, relaxation techniques, light exercise, follow-up with OB as needed        Amount and/or Complexity of Data Reviewed  External Data Reviewed: radiology and notes.     Details: 6/5 US: A single intrauterine gestation is identified.  Cardiac activity is detected at 152bpm.    YOLK SAC:  Present and normal in size and appearance.  MEAN CROWN RUMP LENGTH:  1.28 cm = 7 weeks 3 days.  AMNIOTIC FLUID/SAC SHAPE:  Within expected normal range.     UTERUS/ADNEXA:  No adnexal mass or pathologic cyst. No free fluid identified.     IMPRESSION: IUP,    Final DARIA: 1/19/26 by ultrasound at this encounter.             Labs: ordered. Decision-making details documented in ED Course.    Risk  OTC drugs.  Prescription drug management.        ED  Course as of 25   180 WBC(!): 13.43  Mild leukocytosis, can be normal in early pregnancy   1810 Leukocytes, UA(!): 1+  Will await micro   1820 RBC Urine: None Seen   182 WBC, UA: 0-1  No UTI, likely contaminant   182 Epithelial Cells(!): Moderate   182 Bacteria, UA: Occasional    TSH 3RD GENERATON: 0.767       Medications   sodium chloride 0.9 % bolus 1,000 mL (0 mL Intravenous Stopped 25)   acetaminophen (Ofirmev) injection 1,000 mg (0 mg Intravenous Stopped 25)       ED Risk Strat Scores                    No data recorded                            History of Present Illness       Chief Complaint   Patient presents with    Abdominal Pain Pregnant     Intermittent lower abdominal pain since last night. Denies any vaginal bleeding or discharge. 1 episode of vomiting this morning.        Past Medical History[1]   Past Surgical History[2]   Family History[3]   Social History[4]   E-Cigarette/Vaping    E-Cigarette Use Never User       E-Cigarette/Vaping Substances    Nicotine No     THC No     CBD No     Flavoring No     Other No     Unknown No       I have reviewed and agree with the history as documented.     PMH: Acquired hypothyroidism, Depression/Anxiety, Hypercholesterolemia,HSV genitalia  PSH: Left Breast lumpectomy  , 10 weeks pregnant based on US    Pt presents to ED c/o 2 day h/o intermittent, but persistent, diffuse lower abd pain, headache, dizziness, chest pressure/heaviness.  NO fever, chills, uri sx, sob, + nausea/1 episode of vomiting this am, but has tolerated food since then/ate lunch, no bowel changes, no abn vag bleeding/dc, urinary complaints, visual changes        Review of Systems   Constitutional:  Negative for fever.   HENT:  Negative for congestion, nosebleeds and sore throat.    Eyes:  Negative for visual disturbance.   Respiratory:  Negative for cough and shortness of breath.    Cardiovascular:  Positive for chest pain. Negative  for leg swelling.   Gastrointestinal:  Positive for abdominal pain, nausea and vomiting.   Genitourinary:  Negative for dysuria, frequency, genital sores, vaginal bleeding and vaginal discharge.   Musculoskeletal:  Negative for arthralgias and myalgias.   Skin:  Negative for rash.   Neurological:  Positive for dizziness and headaches. Negative for weakness.   Psychiatric/Behavioral:  Negative for behavioral problems. The patient is nervous/anxious.    All other systems reviewed and are negative.          Objective       ED Triage Vitals [06/24/25 1719]   Temperature Pulse Blood Pressure Respirations SpO2 Patient Position - Orthostatic VS   97.5 °F (36.4 °C) 75 144/78 20 97 % --      Temp Source Heart Rate Source BP Location FiO2 (%) Pain Score    Oral Monitor -- -- --      Vitals      Date and Time Temp Pulse SpO2 Resp BP Pain Score FACES Pain Rating User   06/24/25 1849 -- 62 100 % 20 126/62 -- -- SB   06/24/25 1719 97.5 °F (36.4 °C) 75 97 % 20 144/78 -- --             Physical Exam  Vitals and nursing note reviewed.   Constitutional:       General: She is in acute distress.      Appearance: She is well-developed.   HENT:      Head: Normocephalic and atraumatic.      Nose: Nose normal.      Mouth/Throat:      Mouth: Mucous membranes are moist.      Pharynx: Oropharynx is clear.     Eyes:      Conjunctiva/sclera: Conjunctivae normal.       Cardiovascular:      Rate and Rhythm: Normal rate and regular rhythm.   Pulmonary:      Effort: Pulmonary effort is normal. No respiratory distress.      Breath sounds: Normal breath sounds.   Abdominal:      General: Bowel sounds are normal.      Palpations: Abdomen is soft.      Tenderness: There is no abdominal tenderness. There is no guarding or rebound.      Comments: Bedside US done by me with + good fetal movement, + cardiac activity seen     Musculoskeletal:         General: Normal range of motion.      Cervical back: Normal range of motion.     Skin:     General: Skin  is warm and dry.     Neurological:      Mental Status: She is alert and oriented to person, place, and time.     Psychiatric:         Behavior: Behavior normal.      Comments: + slightly anxious         Results Reviewed       Procedure Component Value Units Date/Time    TSH, 3rd generation with Free T4 reflex [[id number]]  (Normal) Collected: 06/24/25 1755    Lab Status: Final result Specimen: Blood from Arm, Right Updated: 06/24/25 1837     TSH 3RD GENERATION 0.767 uIU/mL     Comprehensive metabolic panel [[id number]]  (Abnormal) Collected: 06/24/25 1755    Lab Status: Final result Specimen: Blood from Arm, Right Updated: 06/24/25 1822     Sodium 135 mmol/L      Potassium 3.7 mmol/L      Chloride 102 mmol/L      CO2 23 mmol/L      ANION GAP 10 mmol/L      BUN 11 mg/dL      Creatinine 0.54 mg/dL      Glucose 75 mg/dL      Calcium 9.4 mg/dL      AST 14 U/L      ALT 13 U/L      Alkaline Phosphatase 58 U/L      Total Protein 7.0 g/dL      Albumin 4.0 g/dL      Total Bilirubin 0.21 mg/dL      eGFR 125 ml/min/1.73sq m     Narrative:      National Kidney Disease Foundation guidelines for Chronic Kidney Disease (CKD):     Stage 1 with normal or high GFR (GFR > 90 mL/min/1.73 square meters)    Stage 2 Mild CKD (GFR = 60-89 mL/min/1.73 square meters)    Stage 3A Moderate CKD (GFR = 45-59 mL/min/1.73 square meters)    Stage 3B Moderate CKD (GFR = 30-44 mL/min/1.73 square meters)    Stage 4 Severe CKD (GFR = 15-29 mL/min/1.73 square meters)    Stage 5 End Stage CKD (GFR <15 mL/min/1.73 square meters)  Note: GFR calculation is accurate only with a steady state creatinine    Urine Microscopic [[id number]]  (Abnormal) Collected: 06/24/25 1751    Lab Status: Final result Specimen: Urine, Clean Catch Updated: 06/24/25 1813     RBC, UA None Seen /hpf      WBC, UA 0-1 /hpf      Epithelial Cells Moderate /hpf      Bacteria, UA Occasional /hpf      URINE COMMENT --    UA w Reflex to Microscopic w Reflex to Culture [[id number]]  (Abnormal)  Collected: 06/24/25 1751    Lab Status: Final result Specimen: Urine, Clean Catch Updated: 06/24/25 1807     Color, UA Yellow     Clarity, UA Clear     Specific Gravity, UA <=1.005     pH, UA 6.0     Leukocytes, UA 1+     Nitrite, UA Negative     Protein, UA Negative mg/dl      Glucose, UA Negative mg/dl      Ketones, UA Negative mg/dl      Urobilinogen, UA 0.2 E.U./dl      Bilirubin, UA Negative     Occult Blood, UA Trace-Intact     URINE COMMENT --    Urine culture [[id number]] Collected: 06/24/25 1751    Lab Status: In process Specimen: Urine, Clean Catch Updated: 06/24/25 1807    CBC and differential [[id number]]  (Abnormal) Collected: 06/24/25 1755    Lab Status: Final result Specimen: Blood from Arm, Right Updated: 06/24/25 1807     WBC 13.43 Thousand/uL      RBC 4.08 Million/uL      Hemoglobin 12.3 g/dL      Hematocrit 36.8 %      MCV 90 fL      MCH 30.1 pg      MCHC 33.4 g/dL      RDW 12.6 %      MPV 9.2 fL      Platelets 262 Thousands/uL      nRBC 0 /100 WBCs      Segmented % 71 %      Immature Grans % 0 %      Lymphocytes % 22 %      Monocytes % 6 %      Eosinophils Relative 1 %      Basophils Relative 0 %      Absolute Neutrophils 9.39 Thousands/µL      Absolute Immature Grans 0.04 Thousand/uL      Absolute Lymphocytes 2.97 Thousands/µL      Absolute Monocytes 0.83 Thousand/µL      Eosinophils Absolute 0.15 Thousand/µL      Basophils Absolute 0.05 Thousands/µL             No orders to display       Procedures    ED Medication and Procedure Management   Prior to Admission Medications   Prescriptions Last Dose Informant Patient Reported? Taking?   ALPRAZolam (XANAX) 0.25 mg tablet   Yes No   Sig: Take 0.25 mg by mouth 2 (two) times a day as needed for anxiety   Patient not taking: Reported on 6/5/2025   OXcarbazepine (TRILEPTAL) 300 mg tablet   Yes Yes   Sig: Take 300 mg by mouth in the morning and 300 mg in the evening.   Synthroid 150 MCG tablet   No Yes   Sig: Take 1 tablet (150 mcg total) by mouth daily  in the early morning   mirtazapine (REMERON) 30 mg tablet   No Yes   Sig: Take 1 tablet (30 mg total) by mouth daily at bedtime      Facility-Administered Medications: None     Current Discharge Medication List        CONTINUE these medications which have NOT CHANGED    Details   mirtazapine (REMERON) 30 mg tablet Take 1 tablet (30 mg total) by mouth daily at bedtime      OXcarbazepine (TRILEPTAL) 300 mg tablet Take 300 mg by mouth in the morning and 300 mg in the evening.      Synthroid 150 MCG tablet Take 1 tablet (150 mcg total) by mouth daily in the early morning  Qty: 90 tablet, Refills: 1    Associated Diagnoses: Hypothyroidism, unspecified type      ALPRAZolam (XANAX) 0.25 mg tablet Take 0.25 mg by mouth 2 (two) times a day as needed for anxiety           No discharge procedures on file.  ED SEPSIS DOCUMENTATION   Time reflects when diagnosis was documented in both MDM as applicable and the Disposition within this note       Time User Action Codes Description Comment    6/24/2025  6:55 PM Lia Lopez Add [O26.891,  R10.9] Abdominal pain during pregnancy in first trimester     6/24/2025  6:55 PM Lia Lopez [F41.9] Anxiety                      [1]   Past Medical History:  Diagnosis Date    Anxiety     Depression     HPV vaccine counseling     completed series    Hypothyroidism     Varicella vaccine    [2]   Past Surgical History:  Procedure Laterality Date    BREAST BIOPSY  2017    BREAST LUMPECTOMY Left     benign mass   [3]   Family History  Problem Relation Name Age of Onset    Breast cancer Mother Fela 57        BRCA negative    Hypertension Father Kameron     Hyperlipidemia Father Kameron     Liver cancer Brother  16        passed away age 18    Ovarian cancer Maternal Grandmother      No Known Problems Maternal Grandfather      No Known Problems Paternal Grandmother      No Known Problems Paternal Grandfather      Colon cancer Neg Hx     [4]   Social History  Tobacco Use    Smoking status: Never     Smokeless tobacco: Never   Vaping Use    Vaping status: Never Used   Substance Use Topics    Alcohol use: Not Currently     Alcohol/week: 0.0 - 3.0 standard drinks of alcohol     Comment: Occasional    Drug use: Never        Lia Lopez PA-C  06/24/25 9921

## 2025-06-24 NOTE — DISCHARGE INSTRUCTIONS
Use Tylenol every 4 - 6 hours for pain or headache  Get rest and drink plenty of fluids  Light exercise will help with anxiety, try breathing and relaxation techniques    If no improvement follow-up with your doctor in next few days.

## 2025-06-25 LAB
BACTERIA UR CULT: ABNORMAL
BACTERIA UR CULT: ABNORMAL

## 2025-07-02 ENCOUNTER — TELEPHONE (OUTPATIENT)
Age: 30
End: 2025-07-02

## 2025-07-02 ENCOUNTER — PATIENT MESSAGE (OUTPATIENT)
Dept: OBGYN CLINIC | Facility: CLINIC | Age: 30
End: 2025-07-02
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
## 2025-07-03 DIAGNOSIS — Z34.01 ENCOUNTER FOR SUPERVISION OF NORMAL FIRST PREGNANCY IN FIRST TRIMESTER: Primary | ICD-10-CM

## 2025-07-03 NOTE — PATIENT COMMUNICATION
Pt calling in saying that she would like to speak to the . Contacted Priscila in the office, pt is warm transferred.

## 2025-07-03 NOTE — PATIENT COMMUNICATION
Pt was transferred to my line, called dropped and I called pt back. Pt had expressed concerns that she has not received a call back with answers on if blood work could be placed and what medications were safe for her to take. Pt also stated she does not feel like she is being taking seriously. I stated that I will check to see if NP, Shonda Markham, could place labs and that I would reach back out to see if there was an answer on medications she can continue taking. A message was sent to Shonda in regard to blood work.     Patient would like a call back from a manager, nurse or doctor before the holiday weekend.

## 2025-07-03 NOTE — PATIENT COMMUNICATION
Incoming call from patient, states that she is trying to send over letter to office but the fax number is not working. Confirmed fax number patient is using is correct number. Outgoing call to office to confirm number and see if there is additional fax number. Confirmed fax number is correct and there is no alternative fax number. Advised to send letter that was sent via [employer] directly to manager for follow-up.    Informed patient of same. She would like this reviewed as soon as possible and would like prenatal blood work ordered as well.     Routing to .

## 2025-07-03 NOTE — PATIENT COMMUNICATION
Pt calling in saying the call was dropped, pt calling in, while on the line pt saying ART called her directly, pt ended conversation

## 2025-07-03 NOTE — PATIENT INSTRUCTIONS
You elected to have non-invasive prenatal screening (NIPS). This involves a blood test to check for the four most common genetic syndromes (Trisomy 21, Trisomy 13, Trisomy 18, and sex chromosome abnormalities). It also MAY report the biologic sex of the fetus if you opted to learn this information. You can call our office to verbally review results to avoid inadvertently learning this information via [employer], if desired. Results will be visible in your [employer] portal 5-7 business days from when the test is drawn. Please follow all instructions regarding insurance cost/coverage provided to you today. Please contact our office with any concerns or questions. You will need spina bifida screening (called MSAFP) for the baby beginning at 15 weeks gestation, which will be ordered by your obstetrician's office. This test allows for earlier detection of spina bifida than is possible by ultrasound, and is advised in all pregnancies.          Thank you for choosing us for your  care today.  If you have any questions about your ultrasound or care, please do not hesitate to contact us or your primary obstetrician.        Some general instructions for your pregnancy are:    Exercise: Aim for 150 minutes per week of regular exercise.  Walking is great!  Nutrition: Choose healthy sources of calcium, iron, and protein.  Avoid ultraprocessed foods and added sugar.  Learn about Preeclampsia: preeclampsia is a common, potentially serious high blood pressure complication in pregnancy.  A blood pressure of 140mmHg (systolic or top number) or 90mmHg (diastolic or bottom number) should be evaluated by your doctor.  Aspirin is sometimes prescribed in early pregnancy to prevent preeclampsia in women with risk factors - ask your obstetrician if you should be on this medication.  For more resources, visit:  [email or web address]  If you smoke, please try to quit completely but also try to reduce your  smoking by as much as possible (as soon as possible).  Do not vape.  Please also avoid cannabis products.  Other warning signs to watch out for in pregnancy or postpartum: chest pain, obstructed breathing or shortness of breath, seizures, thoughts of hurting yourself or your baby, bleeding, a painful or swollen leg, fever, or headache (see [facility] POST-BIRTH Warning Signs campaign).  If these happen call 911.  Itching is also not normal in pregnancy and if you experience this, especially over your hands and feet, potentially worse at night, notify your doctors.

## 2025-07-03 NOTE — PROGRESS NOTES
Reviewed patient's message to order prenatal blood work with Sharon Palomo.  Sharon Palomo approved ordering routine prenatal panel.  Orders placed.

## 2025-07-07 ENCOUNTER — INITIAL PRENATAL (OUTPATIENT)
Dept: OBGYN CLINIC | Facility: CLINIC | Age: 30
End: 2025-07-07

## 2025-07-07 ENCOUNTER — ROUTINE PRENATAL (OUTPATIENT)
Dept: PERINATAL CARE | Facility: CLINIC | Age: 30
End: 2025-07-07
Attending: OBSTETRICS & GYNECOLOGY
Payer: COMMERCIAL

## 2025-07-07 VITALS
HEIGHT: 68 IN | DIASTOLIC BLOOD PRESSURE: 76 MMHG | HEART RATE: 73 BPM | OXYGEN SATURATION: 98 % | WEIGHT: 191 LBS | SYSTOLIC BLOOD PRESSURE: 118 MMHG | BODY MASS INDEX: 28.95 KG/M2

## 2025-07-07 VITALS
HEIGHT: 68 IN | SYSTOLIC BLOOD PRESSURE: 132 MMHG | HEART RATE: 81 BPM | OXYGEN SATURATION: 98 % | WEIGHT: 193.4 LBS | BODY MASS INDEX: 29.31 KG/M2 | DIASTOLIC BLOOD PRESSURE: 86 MMHG

## 2025-07-07 DIAGNOSIS — O99.281 HYPOTHYROIDISM COMPLICATING PREGNANCY, FIRST TRIMESTER: ICD-10-CM

## 2025-07-07 DIAGNOSIS — Z3A.12 12 WEEKS GESTATION OF PREGNANCY: ICD-10-CM

## 2025-07-07 DIAGNOSIS — Z32.01 ENCOUNTER FOR PREGNANCY TEST, RESULT POSITIVE: ICD-10-CM

## 2025-07-07 DIAGNOSIS — E03.9 ACQUIRED HYPOTHYROIDISM: ICD-10-CM

## 2025-07-07 DIAGNOSIS — O09.71 SUPERVISION OF HIGH RISK PREGNANCY DUE TO SOCIAL PROBLEMS IN FIRST TRIMESTER: ICD-10-CM

## 2025-07-07 DIAGNOSIS — Z11.3 SCREENING EXAMINATION FOR STD (SEXUALLY TRANSMITTED DISEASE): ICD-10-CM

## 2025-07-07 DIAGNOSIS — O09.891 MEDICATION EXPOSURE DURING FIRST TRIMESTER OF PREGNANCY: ICD-10-CM

## 2025-07-07 DIAGNOSIS — Z86.19 HISTORY OF HERPES GENITALIS: ICD-10-CM

## 2025-07-07 DIAGNOSIS — O99.341 DEPRESSION AFFECTING PREGNANCY IN FIRST TRIMESTER, ANTEPARTUM: Primary | ICD-10-CM

## 2025-07-07 DIAGNOSIS — F32.A DEPRESSION AFFECTING PREGNANCY IN FIRST TRIMESTER, ANTEPARTUM: Primary | ICD-10-CM

## 2025-07-07 DIAGNOSIS — E03.9 HYPOTHYROIDISM COMPLICATING PREGNANCY, FIRST TRIMESTER: ICD-10-CM

## 2025-07-07 DIAGNOSIS — Z36.0 ENCOUNTER FOR ANTENATAL SCREENING FOR CHROMOSOMAL ANOMALIES: Primary | ICD-10-CM

## 2025-07-07 PROCEDURE — 36415 COLL VENOUS BLD VENIPUNCTURE: CPT | Performed by: STUDENT IN AN ORGANIZED HEALTH CARE EDUCATION/TRAINING PROGRAM

## 2025-07-07 PROCEDURE — 87591 N.GONORRHOEAE DNA AMP PROB: CPT | Performed by: NURSE PRACTITIONER

## 2025-07-07 PROCEDURE — 87491 CHLMYD TRACH DNA AMP PROBE: CPT | Performed by: NURSE PRACTITIONER

## 2025-07-07 PROCEDURE — 76813 OB US NUCHAL MEAS 1 GEST: CPT | Performed by: STUDENT IN AN ORGANIZED HEALTH CARE EDUCATION/TRAINING PROGRAM

## 2025-07-07 PROCEDURE — 99244 OFF/OP CNSLTJ NEW/EST MOD 40: CPT | Performed by: STUDENT IN AN ORGANIZED HEALTH CARE EDUCATION/TRAINING PROGRAM

## 2025-07-07 PROCEDURE — PNV: Performed by: NURSE PRACTITIONER
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
## 2025-07-07 NOTE — PROGRESS NOTES
CONSULTATION: MATERNAL-FETAL MEDICINE  Name: Anali Red      : 1995      MRN: [id number]  Encounter Provider: Erica Nichole MD  Encounter Date: 2025   Encounter department: [facility] [place]  :  Assessment & Plan  12 weeks gestation of pregnancy  We reviewed the availability of genetic screening, as well as diagnostic testing, which are available to all pregnant women. We reviewed limitations, risks, and benefits of screening and testing. She elected to proceed with Non Invasive Prenatal Screening (NIPS). MSAFP screening should be ordered through your office at 15-20 weeks gestation, and completed prior to fetal anatomic survey. She does not wish to pursue diagnostic testing at this time.     A detailed anatomic survey as well as transvaginal cervical length screening are recommended between 20-22 weeks gestation.        Encounter for  screening for chromosomal anomalies    Orders:    [id number] PLUS Core+SCA    Medication exposure during first trimester of pregnancy  We discussed her medication exposure in early pregnancy.  She follows with psychiatry and her depression is managed with Trileptal, Remeron, and Xanax as needed.  She also took Adderall but discontinued.    Trileptal: Animal studies suggest that oxcarbazepine might increase the chance of birth defects. However, reports on almost 2,600 human pregnancies have not suggested a significantly increased chance of birth defects when oxcarbazepine is used. The chance for birth defects might increase with polypharmacy.  There may be an increased risk for low birthweight.  Some data suggest that people taking oxcarbazepine in pregnancy may need vitamin K supplements near the end of their pregnancies. However, this is controversial and not supported by more recent guidance. Routine  vitamin K supplement at birth is recommended.    Adderall: With regards to fetal or  risks, overall,  amphetamines do not seem to be associated with major congenital malformations. Regarding obstetrical outcomes, there is a possible increased risk for preeclampsia,  birth, and low birthweight.  Regarding long-term outcomes, data are limited, inconsistent, and potentially impacted by confounding factors.  However, a recent, large, well-controlled study demonstrated no increased risks for the use of methylphenidate, amphetamine, dexamphetamine, lisdexamfetamine, modafinil, atomoxetine, or clonidine during pregnancy on the long-term outcomes; neurodevelopmental psychiatric disorders, impairments in vision or hearing, epilepsy, seizures, or growth impairment.  We discussed that should she choose to resume this medication in pregnancy that it should be used at the lowest effective dose.    Remeron: Remeron is not known to increased risk for congenital anomalies.  There may be an increased risk for  delivery and low birthweight.  However, it is difficult to know whether it is the medication or underlying condition medication is used to treat which increases the chance of these complications.  A temporary  withdrawal is possible.    Xanax: While some studies suggested an increase in congenital cardiac disease, other studies have not found an increased risk for birth defects.  Overall, there is no definitive link between Xanax and congenital anomalies.  There may be an increased risk for  birth and low birthweight.  Transient  withdrawal is also possible.    She was also advised that there are risks to untreated and undertreated mental illness in pregnancy.  While each of her medications has risks, the risk-benefit ratio should be carefully considered.  Following our discussion, she will remain on daily Trileptal and Remeron.  She will use Xanax sparingly.  She will remain off Adderall.  Ongoing psychiatry follow-up is recommended.         Supervision of high risk pregnancy due to social  problems in first trimester  Her partner is not involved in the pregnancy.  Information with her health and the pregnancy should not be discussed with him.  She denies current safety concerns.  She has support from family and friends in the area.     Given her partner works within the network, she has privacy concerns.  I will reach out to management and our legal team to ensure we are best protecting her privacy.  She also has concerns about privacy with involvement of residents in her care.  She is aware [facility] is a teaching institution and residents are typically involved in care.  The safety benefit of residents was reviewed particularly if an attending physician is unavailable.  She will consider allowing a senior OB resident in the event of an emergency but would otherwise strongly prefer no resident involvement.           History of Present Illness     Anali is a 30 y.o.  at 12w0d presenting for consultation for aneuploidy screening. .    Her Antepartum course is significant for depression, well-controlled hypothyroidism, and social stressors.    Past Medical History   OB History    Para Term  AB Living   1 0 0 0 0 0   SAB IAB Ectopic Multiple Live Births   0 0 0 0 0      # Outcome Date GA Lbr Michael/2nd Weight Sex Type Anes PTL Lv   1 Current               Obstetric Comments   Menarche: 11      Menses: 28/3-4/regular tampon every 8 hours     Past Medical History[1]  Past Surgical History[2]    Social History:   She denies current use of alcohol, drugs of abuse, tobacco, and marijuana products.   Occupation:  at [employer]  Partner: age 38, resident at [facility]. He is not involved with the pregnancy    Family History:  Family history was reviewed using an office screening tool, and is negative for congenital anomalies, genetic diseases, and thromboembolism in first degree relatives of this pregnancy. Family history is notable for diabetes and hypertension.    Current  "Medications[3]  Allergies: No Known Allergies      Objective   /86 (BP Location: Right arm, Patient Position: Sitting, Cuff Size: Standard)   Pulse 81   Ht 5' 8\" (1.727 m)   Wt 87.7 kg (193 lb 6.4 oz)   LMP 05/04/2025 (Exact Date)   SpO2 98%   BMI 29.41 kg/m²      Patient's last menstrual period was 05/04/2025 (exact date).  Estimated Delivery Date: 1/19/2026, by Ultrasound    General appearance: alert, well appearing, and in no distress.  The remainder of her physical examination was deferred as she was here today for consultation and discussion.    Please refer to \"Imaging\" for ultrasound report from today's visit.          [1]   Past Medical History:  Diagnosis Date    Anxiety     Depression     HPV vaccine counseling     completed series    Hypothyroidism     Varicella     Varicella vaccine    [2]   Past Surgical History:  Procedure Laterality Date    BREAST BIOPSY  2017    BREAST LUMPECTOMY Left     benign mass   [3]   Current Outpatient Medications:     mirtazapine (REMERON) 30 mg tablet, Take 1 tablet (30 mg total) by mouth daily at bedtime, Disp: , Rfl:     OXcarbazepine (TRILEPTAL) 300 mg tablet, Take 300 mg by mouth in the morning and 300 mg in the evening., Disp: , Rfl:     Prenatal Vit-Fe Fumarate-FA (PRENATAL VITAMIN PO), Take by mouth, Disp: , Rfl:     Synthroid 150 MCG tablet, Take 1 tablet (150 mcg total) by mouth daily in the early morning, Disp: 90 tablet, Rfl: 1    [Paused] ALPRAZolam (XANAX) 0.25 mg tablet, Take 0.25 mg by mouth 2 (two) times a day as needed for anxiety (Patient not taking: Reported on 7/2/2025), Disp: , Rfl:     "

## 2025-07-07 NOTE — LETTER
2025     MARYA Singer  [address]  [address]  [place] [address]    Patient: Anali Red   YOB: 1995   Date of Visit: 2025       Dear MARYA Cardozo:    Thank you for referring Anali Red to me for evaluation. Below are my notes for this consultation.    If you have questions, please do not hesitate to call me. I look forward to following your patient along with you.         Sincerely,        Erica Nichole MD        CC: No Recipients    Erica Nichole MD  2025  8:31 PM  Sign when Signing Visit  CONSULTATION: MATERNAL-FETAL MEDICINE  Name: Anali Red      : 1995      MRN: [id number]  Encounter Provider: Erica Nichole MD  Encounter Date: 2025   Encounter department: [facility] [place]  :  Assessment & Plan  12 weeks gestation of pregnancy  We reviewed the availability of genetic screening, as well as diagnostic testing, which are available to all pregnant women. We reviewed limitations, risks, and benefits of screening and testing. She elected to proceed with Non Invasive Prenatal Screening (NIPS). MSAFP screening should be ordered through your office at 15-20 weeks gestation, and completed prior to fetal anatomic survey. She does not wish to pursue diagnostic testing at this time.     A detailed anatomic survey as well as transvaginal cervical length screening are recommended between 20-22 weeks gestation.        Encounter for  screening for chromosomal anomalies    Orders:  •  [id number] PLUS Core+SCA    Medication exposure during first trimester of pregnancy  We discussed her medication exposure in early pregnancy.  She follows with psychiatry and her depression is managed with Trileptal, Remeron, and Xanax as needed.  She also took Adderall but discontinued.    Trileptal: Animal studies suggest that oxcarbazepine might increase the chance of birth defects. However, reports on  almost 2,600 human pregnancies have not suggested a significantly increased chance of birth defects when oxcarbazepine is used. The chance for birth defects might increase with polypharmacy.  There may be an increased risk for low birthweight.  Some data suggest that people taking oxcarbazepine in pregnancy may need vitamin K supplements near the end of their pregnancies. However, this is controversial and not supported by more recent guidance. Routine  vitamin K supplement at birth is recommended.    Adderall: With regards to fetal or  risks, overall, amphetamines do not seem to be associated with major congenital malformations. Regarding obstetrical outcomes, there is a possible increased risk for preeclampsia,  birth, and low birthweight.  Regarding long-term outcomes, data are limited, inconsistent, and potentially impacted by confounding factors.  However, a recent, large, well-controlled study demonstrated no increased risks for the use of methylphenidate, amphetamine, dexamphetamine, lisdexamfetamine, modafinil, atomoxetine, or clonidine during pregnancy on the long-term outcomes; neurodevelopmental psychiatric disorders, impairments in vision or hearing, epilepsy, seizures, or growth impairment.  We discussed that should she choose to resume this medication in pregnancy that it should be used at the lowest effective dose.    Remeron: Remeron is not known to increased risk for congenital anomalies.  There may be an increased risk for  delivery and low birthweight.  However, it is difficult to know whether it is the medication or underlying condition medication is used to treat which increases the chance of these complications.  A temporary  withdrawal is possible.    Xanax: While some studies suggested an increase in congenital cardiac disease, other studies have not found an increased risk for birth defects.  Overall, there is no definitive link between Xanax and  congenital anomalies.  There may be an increased risk for  birth and low birthweight.  Transient  withdrawal is also possible.    She was also advised that there are risks to untreated and undertreated mental illness in pregnancy.  While each of her medications has risks, the risk-benefit ratio should be carefully considered.  Following our discussion, she will remain on daily Trileptal and Remeron.  She will use Xanax sparingly.  She will remain off Adderall.  Ongoing psychiatry follow-up is recommended.         Supervision of high risk pregnancy due to social problems in first trimester  Her partner is not involved in the pregnancy.  Information with her health and the pregnancy should not be discussed with him.  She denies current safety concerns.  She has support from family and friends in the area.     Given her partner works within the network, she has privacy concerns.  I will reach out to management and our legal team to ensure we are best protecting her privacy.  She also has concerns about privacy with involvement of residents in her care.  She is aware [facility] is a teaching institution and residents are typically involved in care.  The safety benefit of residents was reviewed particularly if an attending physician is unavailable.  She will consider allowing a senior OB resident in the event of an emergency but would otherwise strongly prefer no resident involvement.           History of Present Illness    Anali is a 30 y.o.  at 12w0d presenting for consultation for aneuploidy screening. .    Her Antepartum course is significant for depression, well-controlled hypothyroidism, and social stressors.    Past Medical History  OB History    Para Term  AB Living   1 0 0 0 0 0   SAB IAB Ectopic Multiple Live Births   0 0 0 0 0      # Outcome Date GA Lbr Michael/2nd Weight Sex Type Anes PTL Lv   1 Current               Obstetric Comments   Menarche: 11      Menses: 28/3-4/regular  "tampon every 8 hours     Past Medical History[1]  Past Surgical History[2]    Social History:   She denies current use of alcohol, drugs of abuse, tobacco, and marijuana products.   Occupation:  at [employer]  Partner: age 38, resident at [employer]. He is not involved with the pregnancy    Family History:  Family history was reviewed using an office screening tool, and is negative for congenital anomalies, genetic diseases, and thromboembolism in first degree relatives of this pregnancy. Family history is notable for diabetes and hypertension.    Current Medications[3]  Allergies: No Known Allergies      Objective  /86 (BP Location: Right arm, Patient Position: Sitting, Cuff Size: Standard)   Pulse 81   Ht 5' 8\" (1.727 m)   Wt 87.7 kg (193 lb 6.4 oz)   LMP 05/04/2025 (Exact Date)   SpO2 98%   BMI 29.41 kg/m²      Patient's last menstrual period was 05/04/2025 (exact date).  Estimated Delivery Date: 1/19/2026, by Ultrasound    General appearance: alert, well appearing, and in no distress.  The remainder of her physical examination was deferred as she was here today for consultation and discussion.    Please refer to \"Imaging\" for ultrasound report from today's visit.          [1]   Past Medical History:  Diagnosis Date   • Anxiety    • Depression    • HPV vaccine counseling     completed series   • Hypothyroidism    • Varicella    • Varicella vaccine    [2]   Past Surgical History:  Procedure Laterality Date   • BREAST BIOPSY  2017   • BREAST LUMPECTOMY Left     benign mass   [3]   Current Outpatient Medications:   •  mirtazapine (REMERON) 30 mg tablet, Take 1 tablet (30 mg total) by mouth daily at bedtime, Disp: , Rfl:   •  OXcarbazepine (TRILEPTAL) 300 mg tablet, Take 300 mg by mouth in the morning and 300 mg in the evening., Disp: , Rfl:   •  Prenatal Vit-Fe Fumarate-FA (PRENATAL VITAMIN PO), Take by mouth, Disp: , Rfl:   •  Synthroid 150 MCG tablet, Take 1 tablet (150 mcg total) by mouth daily in " the early morning, Disp: 90 tablet, Rfl: 1  •  [Paused] ALPRAZolam (XANAX) 0.25 mg tablet, Take 0.25 mg by mouth 2 (two) times a day as needed for anxiety (Patient not taking: Reported on 7/2/2025), Disp: , Rfl:

## 2025-07-07 NOTE — ASSESSMENT & PLAN NOTE
We discussed her medication exposure in early pregnancy.  She follows with psychiatry and her depression is managed with Trileptal, Remeron, and Xanax as needed.  She also took Adderall but discontinued.    Trileptal: Animal studies suggest that oxcarbazepine might increase the chance of birth defects. However, reports on almost 2,600 human pregnancies have not suggested a significantly increased chance of birth defects when oxcarbazepine is used. The chance for birth defects might increase with polypharmacy.  There may be an increased risk for low birthweight.  Some data suggest that people taking oxcarbazepine in pregnancy may need vitamin K supplements near the end of their pregnancies. However, this is controversial and not supported by more recent guidance. Routine  vitamin K supplement at birth is recommended.    Adderall: With regards to fetal or  risks, overall, amphetamines do not seem to be associated with major congenital malformations. Regarding obstetrical outcomes, there is a possible increased risk for preeclampsia,  birth, and low birthweight.  Regarding long-term outcomes, data are limited, inconsistent, and potentially impacted by confounding factors.  However, a recent, large, well-controlled study demonstrated no increased risks for the use of methylphenidate, amphetamine, dexamphetamine, lisdexamfetamine, modafinil, atomoxetine, or clonidine during pregnancy on the long-term outcomes; neurodevelopmental psychiatric disorders, impairments in vision or hearing, epilepsy, seizures, or growth impairment.  We discussed that should she choose to resume this medication in pregnancy that it should be used at the lowest effective dose.    Remeron: Remeron is not known to increased risk for congenital anomalies.  There may be an increased risk for  delivery and low birthweight.  However, it is difficult to know whether it is the medication or underlying condition medication  is used to treat which increases the chance of these complications.  A temporary  withdrawal is possible.    Xanax: While some studies suggested an increase in congenital cardiac disease, other studies have not found an increased risk for birth defects.  Overall, there is no definitive link between Xanax and congenital anomalies.  There may be an increased risk for  birth and low birthweight.  Transient  withdrawal is also possible.    She was also advised that there are risks to untreated and undertreated mental illness in pregnancy.  While each of her medications has risks, the risk-benefit ratio should be carefully considered.  Following our discussion, she will remain on daily Trileptal and Remeron.  She will use Xanax sparingly.  She will remain off Adderall.  Ongoing psychiatry follow-up is recommended.

## 2025-07-07 NOTE — PROGRESS NOTES
30-year-old -0-0-0 for initial prenatal exam at 12w gestation.    Histories updated/ reviewed in detail.    Hypothyroidism-   25 TSH 0.77, taking synthroid 150 mcg daily (Dr. Lewis).  Repeat Q trimester    Depression/anxiety-  seen today by Dr Nichole at [facility].  Will continue her trileptal and remeron, use xanax sparingly/if at all, and stop adderall.  Nuchal translucency was drawn.    Hx genital HSV type 1- plan valacyclovir suppression at 36w    Father with hx of diabetes-- added early 1 hour gtt to lab work.  Accepts blood transfusion.  2022 pap normal    Exam:  s=d, normal FHTs, normal BP  Cervix long and closed    G/C collected today.  Will have OB nurse send all educational links.  Offer MSAFP next visit.  RV 4w

## 2025-07-07 NOTE — ASSESSMENT & PLAN NOTE
We discussed her hypothyroidism.  Her most recent TSH was within normal limits at 0.767.  I recommend  adjusting her Synthroid dose to a TSH level with the trimester specific goal  in (mU/ML) as follows: First trimester 0.1 to 2.5, Second trimester 0.2 to 3.0, Third trimester 0.3 to 3.0. Pregnancy can significantly increase requirements for thyroid hormones especially during the second trimester.  As long as the patient's hypothyroidism is well controlled, she should not have any significant increased risk of adverse pregnancy outcome and no deviation from normal labor and delivery is required.

## 2025-07-07 NOTE — PROGRESS NOTES
Patient chose to have [facility] [id number] Non-Invasive Prenatal Screen [id number] PLUS w/ SCA, WITH fetal sex.  Patient choose to be billed through insurance.     Patient given brochure and is aware [facility] will contact patient's insurance and coordinate coverage.  Provided [facility] contact information. General inquiries [phone], Cost estimates [phone] and [facility] Billing [phone]. Website [email or web address].     Blood collection tubes labeled with patient identifiers (name, medical record number, and date of birth).     Filled out [facility] order form. Patient chose to have blood drawn in our office at time of visit. NIPS was drawn from left arm with a butterfly needle by Guerda MEJIA .      If patient chose to have blood work drawn at a [facility] lab we requested patient notify MFM (via phone call or [employer] message) when blood collected so office can follow up on results.       Maternal Fetal Medicine will have results in approximately 5-7 business days and will call patient or notify via [employer].  Patient aware viewing lab result online will reveal fetal sex if ordered.    Patient verbalized understanding of all instructions and no questions at this time.

## 2025-07-08 ENCOUNTER — APPOINTMENT (OUTPATIENT)
Dept: LAB | Facility: HOSPITAL | Age: 30
End: 2025-07-08
Payer: COMMERCIAL

## 2025-07-08 DIAGNOSIS — O99.341 DEPRESSION AFFECTING PREGNANCY IN FIRST TRIMESTER, ANTEPARTUM: ICD-10-CM

## 2025-07-08 DIAGNOSIS — F32.A DEPRESSION AFFECTING PREGNANCY IN FIRST TRIMESTER, ANTEPARTUM: ICD-10-CM

## 2025-07-08 DIAGNOSIS — Z34.01 ENCOUNTER FOR SUPERVISION OF NORMAL FIRST PREGNANCY IN FIRST TRIMESTER: ICD-10-CM

## 2025-07-08 LAB
ABO GROUP BLD: NORMAL
BACTERIA UR QL AUTO: ABNORMAL /HPF
BASOPHILS # BLD AUTO: 0.03 THOUSANDS/ÂΜL (ref 0–0.1)
BASOPHILS NFR BLD AUTO: 0 % (ref 0–1)
BILIRUB UR QL STRIP: NEGATIVE
BLD GP AB SCN SERPL QL: NEGATIVE
C TRACH DNA SPEC QL NAA+PROBE: NEGATIVE
CLARITY UR: CLEAR
COLOR UR: ABNORMAL
EOSINOPHIL # BLD AUTO: 0.11 THOUSAND/ÂΜL (ref 0–0.61)
EOSINOPHIL NFR BLD AUTO: 1 % (ref 0–6)
ERYTHROCYTE [DISTWIDTH] IN BLOOD BY AUTOMATED COUNT: 12.7 % (ref 11.6–15.1)
GLUCOSE 1H P 50 G GLC PO SERPL-MCNC: 106 MG/DL (ref 70–134)
GLUCOSE UR STRIP-MCNC: NEGATIVE MG/DL
HBV SURFACE AB SER-ACNC: 3.2 MIU/ML
HBV SURFACE AG SER QL: NORMAL
HCT VFR BLD AUTO: 37.6 % (ref 34.8–46.1)
HCV AB SER QL: NORMAL
HGB BLD-MCNC: 13 G/DL (ref 11.5–15.4)
HGB UR QL STRIP.AUTO: NEGATIVE
HIV 1+2 AB+HIV1 P24 AG SERPL QL IA: NORMAL
IMM GRANULOCYTES # BLD AUTO: 0.03 THOUSAND/UL (ref 0–0.2)
IMM GRANULOCYTES NFR BLD AUTO: 0 % (ref 0–2)
KETONES UR STRIP-MCNC: NEGATIVE MG/DL
LEUKOCYTE ESTERASE UR QL STRIP: ABNORMAL
LYMPHOCYTES # BLD AUTO: 1.72 THOUSANDS/ÂΜL (ref 0.6–4.47)
LYMPHOCYTES NFR BLD AUTO: 19 % (ref 14–44)
MCH RBC QN AUTO: 30.6 PG (ref 26.8–34.3)
MCHC RBC AUTO-ENTMCNC: 34.6 G/DL (ref 31.4–37.4)
MCV RBC AUTO: 89 FL (ref 82–98)
MONOCYTES # BLD AUTO: 0.55 THOUSAND/ÂΜL (ref 0.17–1.22)
MONOCYTES NFR BLD AUTO: 6 % (ref 4–12)
MUCOUS THREADS UR QL AUTO: ABNORMAL
N GONORRHOEA DNA SPEC QL NAA+PROBE: NEGATIVE
NEUTROPHILS # BLD AUTO: 6.76 THOUSANDS/ÂΜL (ref 1.85–7.62)
NEUTS SEG NFR BLD AUTO: 74 % (ref 43–75)
NITRITE UR QL STRIP: NEGATIVE
NON-SQ EPI CELLS URNS QL MICRO: ABNORMAL /HPF
NRBC BLD AUTO-RTO: 0 /100 WBCS
PH UR STRIP.AUTO: 7 [PH]
PLATELET # BLD AUTO: 238 THOUSANDS/UL (ref 149–390)
PMV BLD AUTO: 9.1 FL (ref 8.9–12.7)
PROT UR STRIP-MCNC: NEGATIVE MG/DL
RBC # BLD AUTO: 4.25 MILLION/UL (ref 3.81–5.12)
RBC #/AREA URNS AUTO: ABNORMAL /HPF
RH BLD: NEGATIVE
RUBV IGG SERPL IA-ACNC: 59.8 IU/ML
SP GR UR STRIP.AUTO: 1.02 (ref 1–1.03)
SPECIMEN EXPIRATION DATE: NORMAL
TREPONEMA PALLIDUM IGG+IGM AB [PRESENCE] IN SERUM OR PLASMA BY IMMUNOASSAY: NORMAL
UROBILINOGEN UR STRIP-ACNC: <2 MG/DL
WBC # BLD AUTO: 9.2 THOUSAND/UL (ref 4.31–10.16)
WBC #/AREA URNS AUTO: ABNORMAL /HPF

## 2025-07-08 PROCEDURE — 86901 BLOOD TYPING SEROLOGIC RH(D): CPT

## 2025-07-08 PROCEDURE — 87389 HIV-1 AG W/HIV-1&-2 AB AG IA: CPT

## 2025-07-08 PROCEDURE — 86803 HEPATITIS C AB TEST: CPT

## 2025-07-08 PROCEDURE — 86900 BLOOD TYPING SEROLOGIC ABO: CPT

## 2025-07-08 PROCEDURE — 82950 GLUCOSE TEST: CPT

## 2025-07-08 PROCEDURE — 87340 HEPATITIS B SURFACE AG IA: CPT

## 2025-07-08 PROCEDURE — 85025 COMPLETE CBC W/AUTO DIFF WBC: CPT

## 2025-07-08 PROCEDURE — 87086 URINE CULTURE/COLONY COUNT: CPT

## 2025-07-08 PROCEDURE — 86706 HEP B SURFACE ANTIBODY: CPT

## 2025-07-08 PROCEDURE — 36415 COLL VENOUS BLD VENIPUNCTURE: CPT

## 2025-07-08 PROCEDURE — 86762 RUBELLA ANTIBODY: CPT

## 2025-07-08 PROCEDURE — 81001 URINALYSIS AUTO W/SCOPE: CPT

## 2025-07-08 PROCEDURE — 86780 TREPONEMA PALLIDUM: CPT

## 2025-07-08 PROCEDURE — 86850 RBC ANTIBODY SCREEN: CPT

## 2025-07-09 PROBLEM — O26.891 RH NEGATIVE STATUS DURING PREGNANCY IN FIRST TRIMESTER: Status: ACTIVE | Noted: 2025-07-09

## 2025-07-09 PROBLEM — Z67.91 RH NEGATIVE STATUS DURING PREGNANCY IN FIRST TRIMESTER: Status: ACTIVE | Noted: 2025-07-09

## 2025-07-09 LAB — BACTERIA UR CULT: NORMAL

## 2025-07-11 ENCOUNTER — TELEPHONE (OUTPATIENT)
Dept: OBGYN CLINIC | Facility: CLINIC | Age: 30
End: 2025-07-11

## 2025-07-11 NOTE — TELEPHONE ENCOUNTER
Dr. Velazco and I spoke to patient regarding concerns in her care thus far. We reassured her that all staff would be followed up with.    Dr. Velazco explained the dynamic of L&D at [facility] including the call schedule model and resident involvement.     Patient does prefer to continue care with us and to deliver at [facility]. Pt is agreeable to OBGYN resident care but does not want family/emergency residents involved at all. If and when patient presents to hospital, she wants to be identified as a “private/restricted” patient so her name does not show up on census.      Patient will be identified as a [facility] patient for interoffice knowledge.     Patient wants limited interaction with Dr. Church but understands there is a chance she may be the only physician available for visits, triage, and on L&D at any point. Patient is fine with that.     Team will be updated with information to ensure we are all on the same page.     Pt was thankful for talking through her continued plan of care.

## 2025-07-13 LAB
CFDNA.FET/CFDNA.TOTAL SFR FETUS: NORMAL %
CFDNA.FET/CFDNA.TOTAL SFR FETUS: NORMAL %
CITATION REF LAB TEST: NORMAL
FET 13+18+21+X+Y ANEUP PLAS.CFDNA: NEGATIVE
FET CHR 21 TS PLAS.CFDNA QL: NEGATIVE
FET CHR 21 TS PLAS.CFDNA QL: NEGATIVE
FET MS X RISK WBC.DNA+CFDNA QL: NOT DETECTED
FET SEX PLAS.CFDNA DOSAGE CFDNA: NORMAL
FET TS 13 RISK PLAS.CFDNA QL: NEGATIVE
FET X + Y ANEUP RISK PLAS.CFDNA SEQ-IMP: NOT DETECTED
GA EST FROM CONCEPTION DATE: NORMAL D
GESTATIONAL AGE > 9:: YES
LAB DIRECTOR NAME PROVIDER: NORMAL
LABORATORY COMMENT REPORT: NORMAL
LIMITATIONS OF THE TEST: NORMAL
NEGATIVE PREDICTIVE VALUE: NORMAL
PERFORMANCE CHARACTERISTICS: NORMAL
POSITIVE PREDICTIVE VALUE: NORMAL
REF LAB TEST METHOD: NORMAL
SL AMB NOTE:: NORMAL
TEST PERFORMANCE INFO SPEC: NORMAL

## 2025-07-30 DIAGNOSIS — O09.71 SUPERVISION OF HIGH RISK PREGNANCY DUE TO SOCIAL PROBLEMS IN FIRST TRIMESTER: Primary | ICD-10-CM

## 2025-08-04 ENCOUNTER — INITIAL PRENATAL (OUTPATIENT)
Dept: OBGYN CLINIC | Facility: CLINIC | Age: 30
End: 2025-08-04

## 2025-08-04 ENCOUNTER — APPOINTMENT (OUTPATIENT)
Dept: LAB | Age: 30
End: 2025-08-04
Payer: COMMERCIAL

## 2025-08-04 ENCOUNTER — ROUTINE PRENATAL (OUTPATIENT)
Dept: OBGYN CLINIC | Facility: CLINIC | Age: 30
End: 2025-08-04

## 2025-08-04 VITALS
BODY MASS INDEX: 30.16 KG/M2 | HEIGHT: 68 IN | HEART RATE: 81 BPM | SYSTOLIC BLOOD PRESSURE: 122 MMHG | DIASTOLIC BLOOD PRESSURE: 70 MMHG | OXYGEN SATURATION: 99 % | WEIGHT: 199 LBS

## 2025-08-04 VITALS — BODY MASS INDEX: 30.32 KG/M2 | DIASTOLIC BLOOD PRESSURE: 70 MMHG | SYSTOLIC BLOOD PRESSURE: 112 MMHG | WEIGHT: 199.4 LBS

## 2025-08-04 DIAGNOSIS — Z31.430 ENCOUNTER OF FEMALE FOR TESTING FOR GENETIC DISEASE CARRIER STATUS FOR PROCREATIVE MANAGEMENT: ICD-10-CM

## 2025-08-04 DIAGNOSIS — E03.9 HYPOTHYROIDISM AFFECTING PREGNANCY IN SECOND TRIMESTER: ICD-10-CM

## 2025-08-04 DIAGNOSIS — F32.A DEPRESSION AFFECTING PREGNANCY IN FIRST TRIMESTER, ANTEPARTUM: ICD-10-CM

## 2025-08-04 DIAGNOSIS — E03.9 HYPOTHYROIDISM COMPLICATING PREGNANCY, FIRST TRIMESTER: ICD-10-CM

## 2025-08-04 DIAGNOSIS — Z3A.16 16 WEEKS GESTATION OF PREGNANCY: ICD-10-CM

## 2025-08-04 DIAGNOSIS — O09.71 SUPERVISION OF HIGH RISK PREGNANCY DUE TO SOCIAL PROBLEMS IN FIRST TRIMESTER: ICD-10-CM

## 2025-08-04 DIAGNOSIS — O09.92 HIGH-RISK PREGNANCY, SECOND TRIMESTER: Primary | ICD-10-CM

## 2025-08-04 DIAGNOSIS — Z36.89 ENCOUNTER FOR OTHER SPECIFIED ANTENATAL SCREENING: ICD-10-CM

## 2025-08-04 DIAGNOSIS — Z13.31 POSITIVE DEPRESSION SCREENING: ICD-10-CM

## 2025-08-04 DIAGNOSIS — Z36.9 ANTENATAL SCREENING ENCOUNTER: ICD-10-CM

## 2025-08-04 DIAGNOSIS — Z36.89 ENCOUNTER FOR OTHER SPECIFIED ANTENATAL SCREENING: Primary | ICD-10-CM

## 2025-08-04 DIAGNOSIS — O99.281 HYPOTHYROIDISM COMPLICATING PREGNANCY, FIRST TRIMESTER: ICD-10-CM

## 2025-08-04 DIAGNOSIS — O99.282 HYPOTHYROIDISM AFFECTING PREGNANCY IN SECOND TRIMESTER: ICD-10-CM

## 2025-08-04 DIAGNOSIS — O99.341 DEPRESSION AFFECTING PREGNANCY IN FIRST TRIMESTER, ANTEPARTUM: ICD-10-CM

## 2025-08-04 PROCEDURE — PNV: Performed by: NURSE PRACTITIONER

## 2025-08-04 PROCEDURE — 36415 COLL VENOUS BLD VENIPUNCTURE: CPT

## 2025-08-04 PROCEDURE — OBC: Performed by: NURSE PRACTITIONER

## 2025-08-04 PROCEDURE — 82105 ALPHA-FETOPROTEIN SERUM: CPT

## 2025-08-06 LAB
2ND TRIMESTER 4 SCREEN SERPL-IMP: NORMAL
AFP ADJ MOM SERPL: 1.43
AFP INTERP AMN-IMP: NORMAL
AFP INTERP SERPL-IMP: NORMAL
AFP INTERP SERPL-IMP: NORMAL
AFP SERPL-MCNC: 39.8 NG/ML
AGE AT DELIVERY: 32.8 YR
GA METHOD: NORMAL
GA: 16 WEEKS
IDDM PATIENT QL: NO
MULTIPLE PREGNANCY: NO
NEURAL TUBE DEFECT RISK FETUS: 3311 %

## 2025-08-13 ENCOUNTER — CLINICAL SUPPORT (OUTPATIENT)
Dept: POSTPARTUM | Facility: CLINIC | Age: 30
End: 2025-08-13

## 2025-08-14 LAB
CITATION REF LAB TEST: NORMAL
ETHNIC BACKGROUND STATED: NORMAL
GENE DIS ANL CARRIER INTERP-IMP: NORMAL
GENE STUDIED ID: NORMAL
LAB DIRECTOR NAME PROVIDER: NORMAL
LABORATORY COMMENT REPORT: NORMAL
Lab: NORMAL
REASON FOR REFERRAL (NARRATIVE): NORMAL
RECOMMENDATION PATIENT DOC-IMP: NORMAL
REF LAB TEST METHOD: NORMAL
SMN1 GENE MUT ANL BLD/T: NORMAL
SPECIMEN PREPARATION: NORMAL